# Patient Record
Sex: FEMALE | Race: OTHER | NOT HISPANIC OR LATINO
[De-identification: names, ages, dates, MRNs, and addresses within clinical notes are randomized per-mention and may not be internally consistent; named-entity substitution may affect disease eponyms.]

---

## 2017-01-03 ENCOUNTER — APPOINTMENT (OUTPATIENT)
Dept: INTERNAL MEDICINE | Facility: CLINIC | Age: 62
End: 2017-01-03

## 2017-02-07 ENCOUNTER — APPOINTMENT (OUTPATIENT)
Dept: INTERNAL MEDICINE | Facility: CLINIC | Age: 62
End: 2017-02-07

## 2017-03-31 ENCOUNTER — APPOINTMENT (OUTPATIENT)
Dept: CT IMAGING | Facility: CLINIC | Age: 62
End: 2017-03-31

## 2017-03-31 ENCOUNTER — OUTPATIENT (OUTPATIENT)
Dept: OUTPATIENT SERVICES | Facility: HOSPITAL | Age: 62
LOS: 1 days | End: 2017-03-31
Payer: COMMERCIAL

## 2017-03-31 DIAGNOSIS — Z41.9 ENCOUNTER FOR PROCEDURE FOR PURPOSES OTHER THAN REMEDYING HEALTH STATE, UNSPECIFIED: Chronic | ICD-10-CM

## 2017-03-31 DIAGNOSIS — Z95.5 PRESENCE OF CORONARY ANGIOPLASTY IMPLANT AND GRAFT: Chronic | ICD-10-CM

## 2017-03-31 DIAGNOSIS — Z90.89 ACQUIRED ABSENCE OF OTHER ORGANS: Chronic | ICD-10-CM

## 2017-03-31 DIAGNOSIS — Z00.8 ENCOUNTER FOR OTHER GENERAL EXAMINATION: ICD-10-CM

## 2017-03-31 DIAGNOSIS — Z90.49 ACQUIRED ABSENCE OF OTHER SPECIFIED PARTS OF DIGESTIVE TRACT: Chronic | ICD-10-CM

## 2017-03-31 PROCEDURE — G0297: CPT

## 2017-05-12 ENCOUNTER — APPOINTMENT (OUTPATIENT)
Dept: INTERNAL MEDICINE | Facility: CLINIC | Age: 62
End: 2017-05-12

## 2017-08-30 ENCOUNTER — APPOINTMENT (OUTPATIENT)
Dept: INTERNAL MEDICINE | Facility: CLINIC | Age: 62
End: 2017-08-30
Payer: COMMERCIAL

## 2017-08-30 PROCEDURE — 99214 OFFICE O/P EST MOD 30 MIN: CPT | Mod: 25

## 2017-08-30 PROCEDURE — 36415 COLL VENOUS BLD VENIPUNCTURE: CPT

## 2017-12-28 ENCOUNTER — APPOINTMENT (OUTPATIENT)
Dept: INTERNAL MEDICINE | Facility: CLINIC | Age: 62
End: 2017-12-28
Payer: COMMERCIAL

## 2017-12-28 DIAGNOSIS — Z92.89 PERSONAL HISTORY OF OTHER MEDICAL TREATMENT: ICD-10-CM

## 2017-12-28 DIAGNOSIS — Z78.9 OTHER SPECIFIED HEALTH STATUS: ICD-10-CM

## 2017-12-28 DIAGNOSIS — Z86.79 PERSONAL HISTORY OF OTHER DISEASES OF THE CIRCULATORY SYSTEM: ICD-10-CM

## 2017-12-28 DIAGNOSIS — Z95.820 PERIPHERAL VASCULAR ANGIOPLASTY STATUS WITH IMPLANTS AND GRAFTS: ICD-10-CM

## 2017-12-28 DIAGNOSIS — R74.8 ABNORMAL LEVELS OF OTHER SERUM ENZYMES: ICD-10-CM

## 2017-12-28 DIAGNOSIS — Z82.49 FAMILY HISTORY OF ISCHEMIC HEART DISEASE AND OTHER DISEASES OF THE CIRCULATORY SYSTEM: ICD-10-CM

## 2017-12-28 DIAGNOSIS — R91.8 OTHER NONSPECIFIC ABNORMAL FINDING OF LUNG FIELD: ICD-10-CM

## 2017-12-28 PROCEDURE — 93000 ELECTROCARDIOGRAM COMPLETE: CPT

## 2017-12-28 PROCEDURE — 36415 COLL VENOUS BLD VENIPUNCTURE: CPT

## 2017-12-28 PROCEDURE — 94010 BREATHING CAPACITY TEST: CPT

## 2017-12-28 PROCEDURE — 99396 PREV VISIT EST AGE 40-64: CPT | Mod: 25

## 2017-12-28 PROCEDURE — G0444 DEPRESSION SCREEN ANNUAL: CPT

## 2018-08-08 PROBLEM — Z92.89 H/O MAMMOGRAM: Status: RESOLVED | Noted: 2018-08-08 | Resolved: 2018-08-08

## 2018-08-08 PROBLEM — Z82.49 FAMILY HISTORY OF CARDIAC DISORDER: Status: ACTIVE | Noted: 2018-08-08

## 2018-08-08 PROBLEM — Z95.820 S/P PERIPHERAL ARTERY ANGIOPLASTY WITH STENT PLACEMENT: Status: RESOLVED | Noted: 2018-08-08 | Resolved: 2018-08-08

## 2018-08-08 PROBLEM — Z86.79 HISTORY OF INTERMITTENT CLAUDICATION: Status: RESOLVED | Noted: 2018-08-08 | Resolved: 2018-08-08

## 2018-08-08 PROBLEM — Z92.89 HISTORY OF EKG: Status: RESOLVED | Noted: 2018-08-08 | Resolved: 2018-08-08

## 2018-08-08 PROBLEM — R74.8 ALKALINE PHOSPHATASE RAISED: Status: RESOLVED | Noted: 2018-08-08 | Resolved: 2018-08-08

## 2018-08-08 PROBLEM — Z92.89 HISTORY OF BONE DENSITY STUDY: Status: RESOLVED | Noted: 2018-08-08 | Resolved: 2018-08-08

## 2018-08-08 PROBLEM — Z78.9 CURRENT DRINKER OF ALCOHOL: Status: ACTIVE | Noted: 2018-08-08

## 2018-09-12 ENCOUNTER — APPOINTMENT (OUTPATIENT)
Dept: INTERNAL MEDICINE | Facility: CLINIC | Age: 63
End: 2018-09-12
Payer: COMMERCIAL

## 2018-09-12 VITALS
BODY MASS INDEX: 24.92 KG/M2 | HEIGHT: 64 IN | DIASTOLIC BLOOD PRESSURE: 70 MMHG | WEIGHT: 146 LBS | SYSTOLIC BLOOD PRESSURE: 110 MMHG

## 2018-09-12 PROCEDURE — 99406 BEHAV CHNG SMOKING 3-10 MIN: CPT

## 2018-09-12 PROCEDURE — 99215 OFFICE O/P EST HI 40 MIN: CPT | Mod: 25

## 2018-09-12 PROCEDURE — 36415 COLL VENOUS BLD VENIPUNCTURE: CPT

## 2018-09-12 RX ORDER — FLUTICASONE PROPIONATE 50 UG/1
50 SPRAY, METERED NASAL DAILY
Qty: 1 | Refills: 5 | Status: DISCONTINUED | COMMUNITY
End: 2018-09-12

## 2018-09-12 NOTE — HISTORY OF PRESENT ILLNESS
[FreeTextEntry1] : check up on medical issues [de-identified] : F/UP GYN\par CARDIAC STABLE\par COLON PENDING\par She is for routine followup on multiple medical problems. She recently saw her gynecologist a negative evaluation. She saw her cardiologist and had a normal evaluation she has both cardiac and peripheral artery stents. She is compliant with medications. There is no complaints of chest pain or shortness of breath. She is due for followup pulmonary

## 2018-09-12 NOTE — COUNSELING
[Tobacco Use Cessation Intermediate Greater Than 3 Minutes Up to 10 Minutes] : Tobacco Use Cessation Intermediate Greater Than 3 Minutes Up to 10 Minutes [de-identified] : Cigarette cessation counseling was provided to the patient. She will follow Dr. Molina for CAT scan and we work [place]

## 2018-09-12 NOTE — PLAN
[FreeTextEntry1] : Patient examined and adjustments to therapy for HBP not need All labs reviewed with patient as well. Review of systems and physical exam discussed with patient. Care plan for future followups discussed with patient. All issues of health for the current year discussed in depth with patient who was conversant and all relevant issues addressed.\par Cholesterol levels discussed with patient. Compliance with oral medication were also addressed . Ideal LDL levels were  discussed with the patient. All issues regarding the implications of high cholesterol necessity for treatment were discussed with the patient who is conversant and all relevant questions were asked and answered. Patient LDL goal of less than 70. She is compliant with her follow up with cardiologist and repeat lab will be drawn today. In addition she is prediabetic and A1c will be checked\par She has a history COPD and needs yearly CAT scan screening. She did not get it since the last few years and will be referred to Dr. Myrick for followup evaluation and CAT scan.\par \par Routine laboratory data will be drawn, scheduled for a follow up well is in 6 months and issues of immunization and smoking cessation were discussed

## 2018-09-13 LAB
ALBUMIN SERPL ELPH-MCNC: 4.4 G/DL
ALP BLD-CCNC: 103 U/L
ALT SERPL-CCNC: 17 U/L
ANION GAP SERPL CALC-SCNC: 13 MMOL/L
AST SERPL-CCNC: 17 U/L
BASOPHILS # BLD AUTO: 0.08 K/UL
BASOPHILS NFR BLD AUTO: 0.9 %
BILIRUB SERPL-MCNC: 0.3 MG/DL
BUN SERPL-MCNC: 20 MG/DL
CALCIUM SERPL-MCNC: 10.4 MG/DL
CHLORIDE SERPL-SCNC: 101 MMOL/L
CHOLEST SERPL-MCNC: 159 MG/DL
CHOLEST/HDLC SERPL: 2.9 RATIO
CO2 SERPL-SCNC: 24 MMOL/L
CREAT SERPL-MCNC: 0.67 MG/DL
EOSINOPHIL # BLD AUTO: 0.36 K/UL
EOSINOPHIL NFR BLD AUTO: 4.1 %
GLUCOSE SERPL-MCNC: 103 MG/DL
HBA1C MFR BLD HPLC: 5.8 %
HCT VFR BLD CALC: 41.7 %
HDLC SERPL-MCNC: 55 MG/DL
HGB BLD-MCNC: 13.2 G/DL
IMM GRANULOCYTES NFR BLD AUTO: 0.2 %
LDLC SERPL CALC-MCNC: 68 MG/DL
LYMPHOCYTES # BLD AUTO: 2.55 K/UL
LYMPHOCYTES NFR BLD AUTO: 29 %
MAN DIFF?: NORMAL
MCHC RBC-ENTMCNC: 28.8 PG
MCHC RBC-ENTMCNC: 31.7 GM/DL
MCV RBC AUTO: 90.8 FL
MONOCYTES # BLD AUTO: 0.56 K/UL
MONOCYTES NFR BLD AUTO: 6.4 %
NEUTROPHILS # BLD AUTO: 5.21 K/UL
NEUTROPHILS NFR BLD AUTO: 59.4 %
PLATELET # BLD AUTO: 261 K/UL
POTASSIUM SERPL-SCNC: 5.1 MMOL/L
PROT SERPL-MCNC: 7 G/DL
RBC # BLD: 4.59 M/UL
RBC # FLD: 13.9 %
SODIUM SERPL-SCNC: 138 MMOL/L
TRIGL SERPL-MCNC: 178 MG/DL
WBC # FLD AUTO: 8.78 K/UL

## 2018-11-26 ENCOUNTER — MEDICATION RENEWAL (OUTPATIENT)
Age: 63
End: 2018-11-26

## 2018-11-29 ENCOUNTER — APPOINTMENT (OUTPATIENT)
Dept: GASTROENTEROLOGY | Facility: CLINIC | Age: 63
End: 2018-11-29
Payer: COMMERCIAL

## 2018-11-29 VITALS
OXYGEN SATURATION: 98 % | HEIGHT: 64 IN | RESPIRATION RATE: 15 BRPM | SYSTOLIC BLOOD PRESSURE: 137 MMHG | WEIGHT: 145 LBS | HEART RATE: 79 BPM | BODY MASS INDEX: 24.75 KG/M2 | DIASTOLIC BLOOD PRESSURE: 92 MMHG

## 2018-11-29 DIAGNOSIS — Z12.11 ENCOUNTER FOR SCREENING FOR MALIGNANT NEOPLASM OF COLON: ICD-10-CM

## 2018-11-29 PROCEDURE — 82272 OCCULT BLD FECES 1-3 TESTS: CPT

## 2018-11-29 PROCEDURE — 99202 OFFICE O/P NEW SF 15 MIN: CPT

## 2018-12-11 ENCOUNTER — MEDICATION RENEWAL (OUTPATIENT)
Age: 63
End: 2018-12-11

## 2019-01-15 ENCOUNTER — MEDICATION RENEWAL (OUTPATIENT)
Age: 64
End: 2019-01-15

## 2019-02-28 ENCOUNTER — MEDICATION RENEWAL (OUTPATIENT)
Age: 64
End: 2019-02-28

## 2019-04-17 ENCOUNTER — APPOINTMENT (OUTPATIENT)
Dept: INTERNAL MEDICINE | Facility: CLINIC | Age: 64
End: 2019-04-17
Payer: COMMERCIAL

## 2019-04-17 VITALS
WEIGHT: 144 LBS | DIASTOLIC BLOOD PRESSURE: 70 MMHG | HEIGHT: 64 IN | BODY MASS INDEX: 24.59 KG/M2 | SYSTOLIC BLOOD PRESSURE: 120 MMHG

## 2019-04-17 PROCEDURE — G0009: CPT

## 2019-04-17 PROCEDURE — 99406 BEHAV CHNG SMOKING 3-10 MIN: CPT | Mod: 25

## 2019-04-17 PROCEDURE — G0296 VISIT TO DETERM LDCT ELIG: CPT

## 2019-04-17 PROCEDURE — 90670 PCV13 VACCINE IM: CPT

## 2019-04-17 PROCEDURE — 99215 OFFICE O/P EST HI 40 MIN: CPT | Mod: 25

## 2019-04-17 NOTE — COUNSELING
[ - Annual Lung Cancer Screening/Share Decision Making Discussion] : Annual Lung Cancer Screening/Share Decision Making Discussion. (I have advised this patient to have a Low Dose CT (LDCT) scan of the lungs and have discussed the following with the patient in a shared decision making discussion:   Benefits of Detection and Early Treatment: There is adequate evidence that annual screening for lung cancer with LDCT in a population of high-risk persons can prevent a substantial number of lung cancer–related deaths. The magnitude of benefit depends on the individual patient's risk for lung cancer, as those who are at highest risk are most likely to benefit. Screening cannot prevent most lung cancer–related deaths, and does not replace smoking cessation. Harms of Detection and Early Intervention and Treatment: The harms associated with LDCT screening include false-negative and false-positive results, incidental findings, over diagnosis, and radiation exposure. False-positive LDCT results occur in a substantial proportion of screened persons; 95% of all positive results do not lead to a diagnosis of cancer. In a high-quality screening program, further imaging can resolve most false-positive results; however, some patients may require invasive procedures. Radiation harms, including cancer resulting from cumulative exposure to radiation, vary depending on the age at the start of screening; the number of scans received; and the person's exposure to other sources of radiation, particularly other medical imaging.) [Tobacco Use Cessation Intermediate Greater Than 3 Minutes Up to 10 Minutes] : Tobacco Use Cessation Intermediate Greater Than 3 Minutes Up to 10 Minutes

## 2019-04-17 NOTE — HISTORY OF PRESENT ILLNESS
[Spouse] : spouse [FreeTextEntry1] : Patient for followup of her multiple medical problems including heart disease, peripheral vascular disease hypertension and diabetes [de-identified] : H&P her in followup for multiple medical problems include being an active smoker heart disease, peripheral vascular disease, prediabetes and hypertension high cholesterol. She is due to see her cardiologist and scheduled her for colonoscopy. She is due for her yearly CAT scan screening of her lungs as well as pneumococcal vaccination.\par \par She has no complaints of chest pain shortness of breath does not have a smokers cough and denies productive sputum. She is compliant with all the medications

## 2019-04-17 NOTE — PHYSICAL EXAM
[No Acute Distress] : no acute distress [Well Nourished] : well nourished [Well Developed] : well developed [Normal Sclera/Conjunctiva] : normal sclera/conjunctiva [Well-Appearing] : well-appearing [PERRL] : pupils equal round and reactive to light [EOMI] : extraocular movements intact [Normal Outer Ear/Nose] : the outer ears and nose were normal in appearance [Normal Oropharynx] : the oropharynx was normal [Supple] : supple [No JVD] : no jugular venous distention [No Lymphadenopathy] : no lymphadenopathy [No Respiratory Distress] : no respiratory distress  [Thyroid Normal, No Nodules] : the thyroid was normal and there were no nodules present [Clear to Auscultation] : lungs were clear to auscultation bilaterally [Normal Rate] : normal rate  [No Accessory Muscle Use] : no accessory muscle use [Regular Rhythm] : with a regular rhythm [Normal S1, S2] : normal S1 and S2 [No Murmur] : no murmur heard [No Abdominal Bruit] : a ~M bruit was not heard ~T in the abdomen [No Carotid Bruits] : no carotid bruits [No Varicosities] : no varicosities [Pedal Pulses Present] : the pedal pulses are present [No Edema] : there was no peripheral edema [No Extremity Clubbing/Cyanosis] : no extremity clubbing/cyanosis [No Palpable Aorta] : no palpable aorta [Soft] : abdomen soft [Non Tender] : non-tender [Non-distended] : non-distended [No Masses] : no abdominal mass palpated [No HSM] : no HSM [Normal Bowel Sounds] : normal bowel sounds [Normal Posterior Cervical Nodes] : no posterior cervical lymphadenopathy [Normal Anterior Cervical Nodes] : no anterior cervical lymphadenopathy [No CVA Tenderness] : no CVA  tenderness [No Spinal Tenderness] : no spinal tenderness [No Joint Swelling] : no joint swelling [Grossly Normal Strength/Tone] : grossly normal strength/tone [No Rash] : no rash [Normal Gait] : normal gait [Coordination Grossly Intact] : coordination grossly intact [No Focal Deficits] : no focal deficits [Deep Tendon Reflexes (DTR)] : deep tendon reflexes were 2+ and symmetric [Normal Affect] : the affect was normal [Normal Insight/Judgement] : insight and judgment were intact

## 2019-04-17 NOTE — PLAN
[FreeTextEntry1] : Multiple medical problems were discussed with the patient in an extended visit.\par \par Patient examined and adjustments to therapy for HBP and notrequired All labs reviewed with patient as well. Review of systems and physical exam discussed with patient. Care plan for future followups discussed with patient. All issues of health for the current year discussed in depth with patient who was conversant and all relevant issues addressed.\par \par Results of current evaluation discussed with patient. Medications were reviewed and all relevant labs as well as a 1C level and glucose levels were discussed in depth with  patient. Further options for ideal control were discussed, long-term complications of diabetes and screening for complications were also discussed. Patient was conversant and all relevant questions were asked and answered. Last A1c was 5.9 on metformin and that will be continued. The possibility of changing to jardiance as a cardioprotective agent is deferred as patient was only prediabetic\par \par Cholesterol levels discussed with patient. Compliance with oral medication were also addressed . Ideal LDL levels were  discussed with the patient. All issues regarding the implications of high cholesterol necessity for treatment were discussed with the patient who is conversant and all relevant questions were asked and answered. Last LDL was under 70 and will be repeated\par \par Patient has known emphysema and still smokes despite smoking cessation counseling. It is time to do patient's yearly followup low-dose CAT scan screening which was ordered in addition to discussion with the patient regarding cessation\par \par All issues regarding patient's health and medical problems have been discussed. The patient understands and concurs with the treatment plan.

## 2019-04-18 LAB
ALBUMIN SERPL ELPH-MCNC: 4.9 G/DL
ALP BLD-CCNC: 103 U/L
ALT SERPL-CCNC: 18 U/L
ANION GAP SERPL CALC-SCNC: 11 MMOL/L
AST SERPL-CCNC: 19 U/L
BILIRUB SERPL-MCNC: 0.4 MG/DL
BUN SERPL-MCNC: 20 MG/DL
CALCIUM SERPL-MCNC: 10.6 MG/DL
CHLORIDE SERPL-SCNC: 102 MMOL/L
CHOLEST SERPL-MCNC: 157 MG/DL
CHOLEST/HDLC SERPL: 3.1 RATIO
CO2 SERPL-SCNC: 24 MMOL/L
CREAT SERPL-MCNC: 0.76 MG/DL
ESTIMATED AVERAGE GLUCOSE: 120 MG/DL
GLUCOSE SERPL-MCNC: 100 MG/DL
HBA1C MFR BLD HPLC: 5.8 %
HDLC SERPL-MCNC: 51 MG/DL
LDLC SERPL CALC-MCNC: 73 MG/DL
POTASSIUM SERPL-SCNC: 5.6 MMOL/L
PROT SERPL-MCNC: 7.1 G/DL
SODIUM SERPL-SCNC: 137 MMOL/L
TRIGL SERPL-MCNC: 166 MG/DL

## 2019-04-19 DIAGNOSIS — E87.5 HYPERKALEMIA: ICD-10-CM

## 2019-04-25 LAB
ALBUMIN SERPL ELPH-MCNC: 4.7 G/DL
ALP BLD-CCNC: 105 U/L
ALT SERPL-CCNC: 19 U/L
ANION GAP SERPL CALC-SCNC: 13 MMOL/L
AST SERPL-CCNC: 17 U/L
BILIRUB SERPL-MCNC: 0.2 MG/DL
BUN SERPL-MCNC: 18 MG/DL
CALCIUM SERPL-MCNC: 10.5 MG/DL
CHLORIDE SERPL-SCNC: 101 MMOL/L
CO2 SERPL-SCNC: 23 MMOL/L
CREAT SERPL-MCNC: 0.79 MG/DL
GLUCOSE SERPL-MCNC: 82 MG/DL
POTASSIUM SERPL-SCNC: 5.3 MMOL/L
PROT SERPL-MCNC: 6.9 G/DL
SODIUM SERPL-SCNC: 137 MMOL/L

## 2019-05-20 ENCOUNTER — RX RENEWAL (OUTPATIENT)
Age: 64
End: 2019-05-20

## 2019-05-20 ENCOUNTER — FORM ENCOUNTER (OUTPATIENT)
Age: 64
End: 2019-05-20

## 2019-05-21 ENCOUNTER — OUTPATIENT (OUTPATIENT)
Dept: OUTPATIENT SERVICES | Facility: HOSPITAL | Age: 64
LOS: 1 days | End: 2019-05-21
Payer: SELF-PAY

## 2019-05-21 ENCOUNTER — APPOINTMENT (OUTPATIENT)
Dept: CT IMAGING | Facility: CLINIC | Age: 64
End: 2019-05-21
Payer: COMMERCIAL

## 2019-05-21 DIAGNOSIS — Z00.00 ENCOUNTER FOR GENERAL ADULT MEDICAL EXAMINATION WITHOUT ABNORMAL FINDINGS: ICD-10-CM

## 2019-05-21 DIAGNOSIS — Z72.0 TOBACCO USE: ICD-10-CM

## 2019-05-21 DIAGNOSIS — Z41.9 ENCOUNTER FOR PROCEDURE FOR PURPOSES OTHER THAN REMEDYING HEALTH STATE, UNSPECIFIED: Chronic | ICD-10-CM

## 2019-05-21 DIAGNOSIS — J43.9 EMPHYSEMA, UNSPECIFIED: ICD-10-CM

## 2019-05-21 DIAGNOSIS — Z90.89 ACQUIRED ABSENCE OF OTHER ORGANS: Chronic | ICD-10-CM

## 2019-05-21 DIAGNOSIS — Z90.49 ACQUIRED ABSENCE OF OTHER SPECIFIED PARTS OF DIGESTIVE TRACT: Chronic | ICD-10-CM

## 2019-05-21 DIAGNOSIS — Z95.5 PRESENCE OF CORONARY ANGIOPLASTY IMPLANT AND GRAFT: Chronic | ICD-10-CM

## 2019-05-21 PROCEDURE — G0297: CPT

## 2019-05-21 PROCEDURE — G0297: CPT | Mod: 26

## 2019-05-27 ENCOUNTER — RX RENEWAL (OUTPATIENT)
Age: 64
End: 2019-05-27

## 2019-06-10 ENCOUNTER — RX RENEWAL (OUTPATIENT)
Age: 64
End: 2019-06-10

## 2019-07-14 ENCOUNTER — RX RENEWAL (OUTPATIENT)
Age: 64
End: 2019-07-14

## 2019-08-12 ENCOUNTER — APPOINTMENT (OUTPATIENT)
Dept: INTERNAL MEDICINE | Facility: CLINIC | Age: 64
End: 2019-08-12
Payer: COMMERCIAL

## 2019-08-12 VITALS
SYSTOLIC BLOOD PRESSURE: 130 MMHG | BODY MASS INDEX: 24.75 KG/M2 | WEIGHT: 145 LBS | DIASTOLIC BLOOD PRESSURE: 70 MMHG | HEIGHT: 64 IN

## 2019-08-12 PROCEDURE — 36415 COLL VENOUS BLD VENIPUNCTURE: CPT

## 2019-08-12 PROCEDURE — 99214 OFFICE O/P EST MOD 30 MIN: CPT | Mod: 25

## 2019-08-12 NOTE — PHYSICAL EXAM
[Well Nourished] : well nourished [No Acute Distress] : no acute distress [Well Developed] : well developed [Well-Appearing] : well-appearing [EOMI] : extraocular movements intact [PERRL] : pupils equal round and reactive to light [Normal Sclera/Conjunctiva] : normal sclera/conjunctiva [Normal Outer Ear/Nose] : the outer ears and nose were normal in appearance [Normal Oropharynx] : the oropharynx was normal [No JVD] : no jugular venous distention [No Lymphadenopathy] : no lymphadenopathy [Supple] : supple [No Respiratory Distress] : no respiratory distress  [Thyroid Normal, No Nodules] : the thyroid was normal and there were no nodules present [No Accessory Muscle Use] : no accessory muscle use [Clear to Auscultation] : lungs were clear to auscultation bilaterally [Normal Rate] : normal rate  [Regular Rhythm] : with a regular rhythm [Normal S1, S2] : normal S1 and S2 [No Murmur] : no murmur heard [No Abdominal Bruit] : a ~M bruit was not heard ~T in the abdomen [No Carotid Bruits] : no carotid bruits [No Varicosities] : no varicosities [Pedal Pulses Present] : the pedal pulses are present [No Edema] : there was no peripheral edema [No Palpable Aorta] : no palpable aorta [No Extremity Clubbing/Cyanosis] : no extremity clubbing/cyanosis [Soft] : abdomen soft [Non Tender] : non-tender [Non-distended] : non-distended [No Masses] : no abdominal mass palpated [No HSM] : no HSM [Normal Bowel Sounds] : normal bowel sounds [Normal Posterior Cervical Nodes] : no posterior cervical lymphadenopathy [Normal Anterior Cervical Nodes] : no anterior cervical lymphadenopathy [No CVA Tenderness] : no CVA  tenderness [No Spinal Tenderness] : no spinal tenderness [No Joint Swelling] : no joint swelling [Grossly Normal Strength/Tone] : grossly normal strength/tone [No Rash] : no rash [Coordination Grossly Intact] : coordination grossly intact [Normal Gait] : normal gait [No Focal Deficits] : no focal deficits [Deep Tendon Reflexes (DTR)] : deep tendon reflexes were 2+ and symmetric [Normal Affect] : the affect was normal [Normal Insight/Judgement] : insight and judgment were intact

## 2019-08-12 NOTE — ASSESSMENT
[FreeTextEntry1] : Patient examined and adjustments to therapy for HBP not required All labs reviewed with patient as well. Review of systems and physical exam discussed with patient. Care plan for future followups discussed with patient. All issues of health for the current year discussed in depth with patient who was conversant and all relevant issues addressed.\par \par Cholesterol levels discussed with patient. Compliance with oral medication were also addressed . Ideal LDL levels were  discussed with the patient. All issues regarding the implications of high cholesterol necessity for treatment were discussed with the patient who is conversant and all relevant questions were asked and answered. Patient on moderate dose statin but has stent was an increased high intensity statin pravastatin 80 mg\par \par Patient with postnasal drip and Claritin added as well as Flonase\par \par Peripheral vascular disease stable and patient exercising overall doing well. We'll follow up in 4 months for general wellness

## 2019-08-12 NOTE — HISTORY OF PRESENT ILLNESS
[FreeTextEntry1] : Patient here for evaluation of multiple medical problems\par  [de-identified] : Patient is here for evaluation of multiple medical problems. She also has significant postnasal drip and chronic cough and allergy issues. She has no complaints of chest pain or shortness of breath and had a recent negative lung screening CAT scan

## 2019-09-13 ENCOUNTER — RX RENEWAL (OUTPATIENT)
Age: 64
End: 2019-09-13

## 2019-11-13 ENCOUNTER — RX RENEWAL (OUTPATIENT)
Age: 64
End: 2019-11-13

## 2019-12-03 PROBLEM — Z13.29 SCREENING FOR THYROID DISORDER: Status: ACTIVE | Noted: 2019-12-03

## 2019-12-05 ENCOUNTER — APPOINTMENT (OUTPATIENT)
Dept: INTERNAL MEDICINE | Facility: CLINIC | Age: 64
End: 2019-12-05
Payer: COMMERCIAL

## 2019-12-05 VITALS
TEMPERATURE: 98.2 F | WEIGHT: 145 LBS | BODY MASS INDEX: 24.75 KG/M2 | HEIGHT: 64 IN | DIASTOLIC BLOOD PRESSURE: 75 MMHG | SYSTOLIC BLOOD PRESSURE: 180 MMHG

## 2019-12-05 DIAGNOSIS — Z13.29 ENCOUNTER FOR SCREENING FOR OTHER SUSPECTED ENDOCRINE DISORDER: ICD-10-CM

## 2019-12-05 PROCEDURE — 99406 BEHAV CHNG SMOKING 3-10 MIN: CPT | Mod: 25

## 2019-12-05 PROCEDURE — G0444 DEPRESSION SCREEN ANNUAL: CPT

## 2019-12-05 PROCEDURE — 90662 IIV NO PRSV INCREASED AG IM: CPT

## 2019-12-05 PROCEDURE — 93000 ELECTROCARDIOGRAM COMPLETE: CPT

## 2019-12-05 PROCEDURE — G0008: CPT

## 2019-12-05 PROCEDURE — 36415 COLL VENOUS BLD VENIPUNCTURE: CPT

## 2019-12-05 PROCEDURE — 99397 PER PM REEVAL EST PAT 65+ YR: CPT | Mod: 25

## 2019-12-05 NOTE — COUNSELING
[Risk of tobacco use and health benefits of smoking cessation discussed] : Risk of tobacco use and health benefits of smoking cessation discussed [Cessation strategies including cessation program discussed] : Cessation strategies including cessation program discussed [Use of nicotine replacement therapies and other medications discussed] : Use of nicotine replacement therapies and other medications discussed [Encouraged to pick a quit date and identify support needed to quit] : Encouraged to pick a quit date and identify support needed to quit [Willing to Quit Smoking] : Not willing to quit smoking [Tobacco Use Cessation Intermediate Greater Than 3 Minutes Up to 10 Minutes] : Tobacco Use Cessation Intermediate Greater Than 3 Minutes Up to 10 Minutes [FreeTextEntry1] : 10

## 2019-12-05 NOTE — HEALTH RISK ASSESSMENT
[Very Good] : ~his/her~ current health as very good [1] : 2) Feeling down, depressed, or hopeless for several days (1) [No falls in past year] : Patient reported no falls in the past year [With Significant Other] : lives with significant other [] :  [Fully functional (bathing, dressing, toileting, transferring, walking, feeding)] : Fully functional (bathing, dressing, toileting, transferring, walking, feeding) [Fully functional (using the telephone, shopping, preparing meals, housekeeping, doing laundry, using] : Fully functional and needs no help or supervision to perform IADLs (using the telephone, shopping, preparing meals, housekeeping, doing laundry, using transportation, managing medications and managing finances) [Patient reported colonoscopy was normal] : Patient reported colonoscopy was normal [HIV test declined] : HIV test declined [Hepatitis C test declined] : Hepatitis C test declined [Patient reported bone density results were normal] : Patient reported bone density results were normal [Patient reported mammogram was normal] : Patient reported mammogram was normal [] : Yes [No] : No [30 or more] : 30 or more [LowDoseCTScan] : [id number] [[id number]] : 2 [Language] : denies difficulty with language [Change in mental status noted] : No change in mental status noted [None] : None [Sexually Active] : sexually active [MammogramDate] : 01/01/2018 [BoneDensityDate] : 01/01/2015 [ColonoscopyDate] : 01/01/2009 [ColonoscopyComments] : overdue with gi

## 2019-12-05 NOTE — REVIEW OF SYSTEMS
[Fatigue] : fatigue [Nasal Discharge] : nasal discharge [Wheezing] : wheezing [Cough] : cough [Negative] : Neurological

## 2019-12-05 NOTE — HISTORY OF PRESENT ILLNESS
[FreeTextEntry1] : wellness and med issues [de-identified] : Patient here for a routine wellness and discussion of multiple medical problems including hypertension peripheral vascular disease heart disease and diabetes. She is compliant with her medications. She has not followed up with GI for colonoscopy.\par Patient continues to smoke despite multiple attempted interventions and yearly counseling but is compliant with CAT scan screening\par She is up-to-date with cardiology evaluation but no note seen. She is on Plavix for unknown reasons\par \par Patient also now with chronic cough ongoing for 2 weeks and to rest her illness with periodic wheezing

## 2019-12-05 NOTE — PHYSICAL EXAM
[No Acute Distress] : no acute distress [Well Developed] : well developed [Well Nourished] : well nourished [Well-Appearing] : well-appearing [Normal Sclera/Conjunctiva] : normal sclera/conjunctiva [PERRL] : pupils equal round and reactive to light [Normal Outer Ear/Nose] : the outer ears and nose were normal in appearance [EOMI] : extraocular movements intact [No JVD] : no jugular venous distention [Normal Oropharynx] : the oropharynx was normal [Supple] : supple [No Lymphadenopathy] : no lymphadenopathy [No Respiratory Distress] : no respiratory distress  [Thyroid Normal, No Nodules] : the thyroid was normal and there were no nodules present [Clear to Auscultation] : lungs were clear to auscultation bilaterally [No Accessory Muscle Use] : no accessory muscle use [Regular Rhythm] : with a regular rhythm [Normal Rate] : normal rate  [Normal S1, S2] : normal S1 and S2 [No Murmur] : no murmur heard [No Carotid Bruits] : no carotid bruits [No Abdominal Bruit] : a ~M bruit was not heard ~T in the abdomen [No Varicosities] : no varicosities [Pedal Pulses Present] : the pedal pulses are present [No Edema] : there was no peripheral edema [No Palpable Aorta] : no palpable aorta [No Extremity Clubbing/Cyanosis] : no extremity clubbing/cyanosis [Soft] : abdomen soft [Non Tender] : non-tender [No Masses] : no abdominal mass palpated [Non-distended] : non-distended [No HSM] : no HSM [Normal Bowel Sounds] : normal bowel sounds [Normal Anterior Cervical Nodes] : no anterior cervical lymphadenopathy [Normal Posterior Cervical Nodes] : no posterior cervical lymphadenopathy [No CVA Tenderness] : no CVA  tenderness [No Joint Swelling] : no joint swelling [No Spinal Tenderness] : no spinal tenderness [Grossly Normal Strength/Tone] : grossly normal strength/tone [No Rash] : no rash [Coordination Grossly Intact] : coordination grossly intact [No Focal Deficits] : no focal deficits [Normal Gait] : normal gait [Normal Affect] : the affect was normal [Deep Tendon Reflexes (DTR)] : deep tendon reflexes were 2+ and symmetric [Normal Insight/Judgement] : insight and judgment were intact

## 2019-12-07 ENCOUNTER — RX RENEWAL (OUTPATIENT)
Age: 64
End: 2019-12-07

## 2019-12-08 LAB
25(OH)D3 SERPL-MCNC: 40.9 NG/ML
ALBUMIN SERPL ELPH-MCNC: 4.8 G/DL
ALP BLD-CCNC: 109 U/L
ALT SERPL-CCNC: 22 U/L
ANION GAP SERPL CALC-SCNC: 15 MMOL/L
APPEARANCE: CLEAR
AST SERPL-CCNC: 20 U/L
BACTERIA: NEGATIVE
BASOPHILS # BLD AUTO: 0.12 K/UL
BASOPHILS NFR BLD AUTO: 1.1 %
BILIRUB SERPL-MCNC: 0.3 MG/DL
BILIRUBIN URINE: NEGATIVE
BLOOD URINE: NEGATIVE
BUN SERPL-MCNC: 20 MG/DL
CALCIUM SERPL-MCNC: 10.3 MG/DL
CHLORIDE SERPL-SCNC: 101 MMOL/L
CHOLEST SERPL-MCNC: 148 MG/DL
CHOLEST/HDLC SERPL: 2.7 RATIO
CO2 SERPL-SCNC: 24 MMOL/L
COLOR: NORMAL
CREAT SERPL-MCNC: 0.78 MG/DL
EOSINOPHIL # BLD AUTO: 0.25 K/UL
EOSINOPHIL NFR BLD AUTO: 2.2 %
ESTIMATED AVERAGE GLUCOSE: 123 MG/DL
GLUCOSE QUALITATIVE U: NEGATIVE
GLUCOSE SERPL-MCNC: 86 MG/DL
HBA1C MFR BLD HPLC: 5.9 %
HCT VFR BLD CALC: 44.7 %
HDLC SERPL-MCNC: 55 MG/DL
HGB BLD-MCNC: 13.9 G/DL
HYALINE CASTS: 0 /LPF
IMM GRANULOCYTES NFR BLD AUTO: 0.7 %
KETONES URINE: NEGATIVE
LDLC SERPL CALC-MCNC: 70 MG/DL
LEUKOCYTE ESTERASE URINE: NEGATIVE
LYMPHOCYTES # BLD AUTO: 3.01 K/UL
LYMPHOCYTES NFR BLD AUTO: 26.4 %
MAN DIFF?: NORMAL
MCHC RBC-ENTMCNC: 29.3 PG
MCHC RBC-ENTMCNC: 31.1 GM/DL
MCV RBC AUTO: 94.1 FL
MICROSCOPIC-UA: NORMAL
MONOCYTES # BLD AUTO: 0.79 K/UL
MONOCYTES NFR BLD AUTO: 6.9 %
NEUTROPHILS # BLD AUTO: 7.15 K/UL
NEUTROPHILS NFR BLD AUTO: 62.7 %
NITRITE URINE: NEGATIVE
PH URINE: 5.5
PLATELET # BLD AUTO: 293 K/UL
POTASSIUM SERPL-SCNC: 5.3 MMOL/L
PROT SERPL-MCNC: 6.9 G/DL
PROTEIN URINE: NEGATIVE
RBC # BLD: 4.75 M/UL
RBC # FLD: 13.8 %
RED BLOOD CELLS URINE: 1 /HPF
SODIUM SERPL-SCNC: 140 MMOL/L
SPECIFIC GRAVITY URINE: 1.01
SQUAMOUS EPITHELIAL CELLS: 2 /HPF
TRIGL SERPL-MCNC: 114 MG/DL
TSH SERPL-ACNC: 3.45 UIU/ML
UROBILINOGEN URINE: NORMAL
WBC # FLD AUTO: 11.4 K/UL
WHITE BLOOD CELLS URINE: 3 /HPF

## 2019-12-17 ENCOUNTER — RX RENEWAL (OUTPATIENT)
Age: 64
End: 2019-12-17

## 2019-12-30 ENCOUNTER — RX RENEWAL (OUTPATIENT)
Age: 64
End: 2019-12-30

## 2020-02-29 ENCOUNTER — TRANSCRIPTION ENCOUNTER (OUTPATIENT)
Age: 65
End: 2020-02-29

## 2020-03-11 ENCOUNTER — RX RENEWAL (OUTPATIENT)
Age: 65
End: 2020-03-11

## 2020-03-23 ENCOUNTER — APPOINTMENT (OUTPATIENT)
Dept: INTERNAL MEDICINE | Facility: CLINIC | Age: 65
End: 2020-03-23

## 2020-05-12 ENCOUNTER — RX RENEWAL (OUTPATIENT)
Age: 65
End: 2020-05-12

## 2020-05-25 ENCOUNTER — RX RENEWAL (OUTPATIENT)
Age: 65
End: 2020-05-25

## 2020-06-07 ENCOUNTER — RX RENEWAL (OUTPATIENT)
Age: 65
End: 2020-06-07

## 2020-06-16 ENCOUNTER — APPOINTMENT (OUTPATIENT)
Dept: INTERNAL MEDICINE | Facility: CLINIC | Age: 65
End: 2020-06-16
Payer: COMMERCIAL

## 2020-06-16 VITALS
SYSTOLIC BLOOD PRESSURE: 122 MMHG | WEIGHT: 137 LBS | BODY MASS INDEX: 23.39 KG/M2 | HEIGHT: 64 IN | DIASTOLIC BLOOD PRESSURE: 70 MMHG | TEMPERATURE: 98 F

## 2020-06-16 PROCEDURE — 99214 OFFICE O/P EST MOD 30 MIN: CPT | Mod: 25

## 2020-06-16 PROCEDURE — 36415 COLL VENOUS BLD VENIPUNCTURE: CPT

## 2020-06-16 NOTE — HISTORY OF PRESENT ILLNESS
[FreeTextEntry1] : Patient here in follow up to last visit and prior issue\par  [de-identified] : Patient with multiple medical problems in routine followup. He has been compliant with all his medications and in fact has voluntarily lost weight. He is up-to-date with screening and vaccines.she has no complaints of chest pain or shortness of breath\par Patient has had no episodes of chest pain and is due for yearly screening CAT scan as she continues to smoke

## 2020-06-16 NOTE — PHYSICAL EXAM
[No Acute Distress] : no acute distress [Well Nourished] : well nourished [Well Developed] : well developed [Normal Sclera/Conjunctiva] : normal sclera/conjunctiva [Well-Appearing] : well-appearing [EOMI] : extraocular movements intact [PERRL] : pupils equal round and reactive to light [Normal Oropharynx] : the oropharynx was normal [Normal Outer Ear/Nose] : the outer ears and nose were normal in appearance [No Lymphadenopathy] : no lymphadenopathy [No JVD] : no jugular venous distention [Supple] : supple [Thyroid Normal, No Nodules] : the thyroid was normal and there were no nodules present [No Accessory Muscle Use] : no accessory muscle use [Clear to Auscultation] : lungs were clear to auscultation bilaterally [No Respiratory Distress] : no respiratory distress  [Regular Rhythm] : with a regular rhythm [Normal Rate] : normal rate  [No Murmur] : no murmur heard [Normal S1, S2] : normal S1 and S2 [No Carotid Bruits] : no carotid bruits [No Abdominal Bruit] : a ~M bruit was not heard ~T in the abdomen [No Edema] : there was no peripheral edema [Pedal Pulses Present] : the pedal pulses are present [No Varicosities] : no varicosities [No Palpable Aorta] : no palpable aorta [No Extremity Clubbing/Cyanosis] : no extremity clubbing/cyanosis [Soft] : abdomen soft [Non Tender] : non-tender [No Masses] : no abdominal mass palpated [Non-distended] : non-distended [No HSM] : no HSM [Normal Bowel Sounds] : normal bowel sounds [Normal Posterior Cervical Nodes] : no posterior cervical lymphadenopathy [No CVA Tenderness] : no CVA  tenderness [Normal Anterior Cervical Nodes] : no anterior cervical lymphadenopathy [No Spinal Tenderness] : no spinal tenderness [No Joint Swelling] : no joint swelling [Grossly Normal Strength/Tone] : grossly normal strength/tone [Coordination Grossly Intact] : coordination grossly intact [No Rash] : no rash [Normal Gait] : normal gait [Deep Tendon Reflexes (DTR)] : deep tendon reflexes were 2+ and symmetric [No Focal Deficits] : no focal deficits [Normal Affect] : the affect was normal [Normal Insight/Judgement] : insight and judgment were intact

## 2020-06-16 NOTE — ASSESSMENT
[FreeTextEntry1] : Patient examined and adjustments to therapy for HBP IS not required All labs reviewed with patient as well. Review of systems and physical exam discussed with patient. Care plan for future followups discussed with patient. All issues of health for the current year discussed in depth with patient who was conversant and all relevant issues addressed.\par \par Cholesterol levels discussed with patient. Compliance with oral medication were also addressed . Ideal LDL levels were  discussed with the patient. All issues regarding the implications of high cholesterol necessity for treatment were discussed with the patient who is conversant and all relevant questions were asked and answered.\par \par Results of current evaluation discussed with patient. Medications were reviewed and all relevant labs as well as a 1C level and glucose levels were discussed in depth with  patient. Further options for ideal control were discussed, long-term complications of diabetes and screening for complications were also discussed. Patient was conversant and all relevant questions were asked and answered.\par \par Again the issue of tobacco use was discussed briefly and will be done again during yearly physical. She continues to smoke with a mild cough. Repeat yearly CAT scan scheduled at the shared decision-making. Labs drawn and she will follow up in 4 months

## 2020-06-17 LAB
ALBUMIN SERPL ELPH-MCNC: 4.9 G/DL
ALP BLD-CCNC: 111 U/L
ALT SERPL-CCNC: 18 U/L
ANION GAP SERPL CALC-SCNC: 15 MMOL/L
AST SERPL-CCNC: 24 U/L
BASOPHILS # BLD AUTO: 0.1 K/UL
BASOPHILS NFR BLD AUTO: 1.4 %
BILIRUB SERPL-MCNC: 0.3 MG/DL
BUN SERPL-MCNC: 22 MG/DL
CALCIUM SERPL-MCNC: 10.1 MG/DL
CHLORIDE SERPL-SCNC: 101 MMOL/L
CHOLEST SERPL-MCNC: 125 MG/DL
CHOLEST/HDLC SERPL: 2.6 RATIO
CO2 SERPL-SCNC: 21 MMOL/L
CREAT SERPL-MCNC: 0.84 MG/DL
EOSINOPHIL # BLD AUTO: 0.24 K/UL
EOSINOPHIL NFR BLD AUTO: 3.3 %
ESTIMATED AVERAGE GLUCOSE: 120 MG/DL
GLUCOSE SERPL-MCNC: 89 MG/DL
HBA1C MFR BLD HPLC: 5.8 %
HCT VFR BLD CALC: 43.1 %
HDLC SERPL-MCNC: 48 MG/DL
HGB BLD-MCNC: 14 G/DL
IMM GRANULOCYTES NFR BLD AUTO: 0.6 %
LDLC SERPL CALC-MCNC: 58 MG/DL
LYMPHOCYTES # BLD AUTO: 2.14 K/UL
LYMPHOCYTES NFR BLD AUTO: 29.5 %
MAN DIFF?: NORMAL
MCHC RBC-ENTMCNC: 29.6 PG
MCHC RBC-ENTMCNC: 32.5 GM/DL
MCV RBC AUTO: 91.1 FL
MONOCYTES # BLD AUTO: 0.51 K/UL
MONOCYTES NFR BLD AUTO: 7 %
NEUTROPHILS # BLD AUTO: 4.22 K/UL
NEUTROPHILS NFR BLD AUTO: 58.2 %
PLATELET # BLD AUTO: 278 K/UL
POTASSIUM SERPL-SCNC: 4.9 MMOL/L
PROT SERPL-MCNC: 7.1 G/DL
RBC # BLD: 4.73 M/UL
RBC # FLD: 13.7 %
SODIUM SERPL-SCNC: 138 MMOL/L
TRIGL SERPL-MCNC: 94 MG/DL
WBC # FLD AUTO: 7.25 K/UL

## 2020-06-30 ENCOUNTER — RX RENEWAL (OUTPATIENT)
Age: 65
End: 2020-06-30

## 2020-07-08 VITALS — HEIGHT: 64 IN | WEIGHT: 136 LBS | BODY MASS INDEX: 23.22 KG/M2

## 2020-07-08 NOTE — DATA REVIEWED
[Lung Cancer Screening] : Patient underwent lung cancer screening [2] : 2 [1] : 1 [TextBox_12] : 03/17 [TextBox_27] : 05/19

## 2020-07-08 NOTE — HISTORY OF PRESENT ILLNESS
[TextBox_13] : Referred by Dr. Parmjit Carty.\par \par Ms. MORALES is a 65 year old female with a history of CAD s/p stents, HTN, high cholesterol, PVD and emphysema.\par \par She was called today to review eligibility for Low-Dose CT lung cancer screening.  Reviewed and confirmed that the patient meets screening eligibility criteria:\par \par 65 years old \par \par Smoking Status: Current Smoker\par \par Number of pack(s) per day: 1\par Number of years smoked: 51\par Number of pack years smokin\par \par Ms. MORALES denies any symptoms of lung cancer, including new cough, change in cough, hemoptysis, and unintentional weight loss.\par \par Ms. MORALES denies any personal history of lung cancer.  No lung cancer in a first degree relative.  Denies any history of occupational exposures.

## 2020-07-13 ENCOUNTER — OUTPATIENT (OUTPATIENT)
Dept: OUTPATIENT SERVICES | Facility: HOSPITAL | Age: 65
LOS: 1 days | End: 2020-07-13
Payer: COMMERCIAL

## 2020-07-13 ENCOUNTER — APPOINTMENT (OUTPATIENT)
Dept: CT IMAGING | Facility: CLINIC | Age: 65
End: 2020-07-13
Payer: COMMERCIAL

## 2020-07-13 DIAGNOSIS — J43.9 EMPHYSEMA, UNSPECIFIED: ICD-10-CM

## 2020-07-13 DIAGNOSIS — Z41.9 ENCOUNTER FOR PROCEDURE FOR PURPOSES OTHER THAN REMEDYING HEALTH STATE, UNSPECIFIED: Chronic | ICD-10-CM

## 2020-07-13 DIAGNOSIS — Z95.5 PRESENCE OF CORONARY ANGIOPLASTY IMPLANT AND GRAFT: Chronic | ICD-10-CM

## 2020-07-13 DIAGNOSIS — Z90.89 ACQUIRED ABSENCE OF OTHER ORGANS: Chronic | ICD-10-CM

## 2020-07-13 DIAGNOSIS — Z90.49 ACQUIRED ABSENCE OF OTHER SPECIFIED PARTS OF DIGESTIVE TRACT: Chronic | ICD-10-CM

## 2020-07-13 DIAGNOSIS — I10 ESSENTIAL (PRIMARY) HYPERTENSION: ICD-10-CM

## 2020-07-13 PROCEDURE — G0297: CPT

## 2020-07-13 PROCEDURE — G0297: CPT | Mod: 26

## 2020-07-31 ENCOUNTER — APPOINTMENT (OUTPATIENT)
Dept: PULMONOLOGY | Facility: CLINIC | Age: 65
End: 2020-07-31
Payer: COMMERCIAL

## 2020-07-31 VITALS
HEIGHT: 63 IN | WEIGHT: 136 LBS | BODY MASS INDEX: 24.1 KG/M2 | SYSTOLIC BLOOD PRESSURE: 124 MMHG | RESPIRATION RATE: 16 BRPM | OXYGEN SATURATION: 96 % | DIASTOLIC BLOOD PRESSURE: 80 MMHG | HEART RATE: 78 BPM

## 2020-07-31 DIAGNOSIS — J20.9 ACUTE BRONCHITIS, UNSPECIFIED: ICD-10-CM

## 2020-07-31 DIAGNOSIS — J45.909 ACUTE BRONCHITIS, UNSPECIFIED: ICD-10-CM

## 2020-07-31 DIAGNOSIS — Z11.59 ENCOUNTER FOR SCREENING FOR OTHER VIRAL DISEASES: ICD-10-CM

## 2020-07-31 PROCEDURE — 99204 OFFICE O/P NEW MOD 45 MIN: CPT

## 2020-07-31 RX ORDER — METHYLPREDNISOLONE 4 MG/1
4 TABLET ORAL
Qty: 1 | Refills: 0 | Status: DISCONTINUED | COMMUNITY
Start: 2019-12-05 | End: 2020-07-31

## 2020-07-31 NOTE — REASON FOR VISIT
[Consultation] : a consultation [Abnormal CXR/ Chest CT] : an abnormal CXR/ chest CT [Cough] : cough [COPD] : COPD [Emphysema] : emphysema

## 2020-07-31 NOTE — PHYSICAL EXAM
[Normal Oropharynx] : normal oropharynx [No Acute Distress] : no acute distress [Normal Appearance] : normal appearance [No Neck Mass] : no neck mass [Normal Rate/Rhythm] : normal rate/rhythm [Normal S1, S2] : normal s1, s2 [No Resp Distress] : no resp distress [No Murmurs] : no murmurs [No Acc Muscle Use] : no acc muscle use [Normal Rhythm and Effort] : normal rhythm and effort [Rhonchi] : rhonchi [No Abnormalities] : no abnormalities [Normal Gait] : normal gait [Benign] : benign [No Clubbing] : no clubbing [No Edema] : no edema [No Cyanosis] : no cyanosis [No Focal Deficits] : no focal deficits [FROM] : FROM [Normal Color/ Pigmentation] : normal color/ pigmentation [Oriented x3] : oriented x3 [Normal Affect] : normal affect

## 2020-08-09 ENCOUNTER — RX RENEWAL (OUTPATIENT)
Age: 65
End: 2020-08-09

## 2020-08-11 ENCOUNTER — APPOINTMENT (OUTPATIENT)
Dept: PULMONOLOGY | Facility: CLINIC | Age: 65
End: 2020-08-11
Payer: COMMERCIAL

## 2020-08-11 PROCEDURE — 99211 OFF/OP EST MAY X REQ PHY/QHP: CPT

## 2020-08-12 ENCOUNTER — APPOINTMENT (OUTPATIENT)
Dept: NUCLEAR MEDICINE | Facility: CLINIC | Age: 65
End: 2020-08-12

## 2020-08-12 ENCOUNTER — OUTPATIENT (OUTPATIENT)
Dept: OUTPATIENT SERVICES | Facility: HOSPITAL | Age: 65
LOS: 1 days | End: 2020-08-12
Payer: COMMERCIAL

## 2020-08-12 DIAGNOSIS — Z41.9 ENCOUNTER FOR PROCEDURE FOR PURPOSES OTHER THAN REMEDYING HEALTH STATE, UNSPECIFIED: Chronic | ICD-10-CM

## 2020-08-12 DIAGNOSIS — J43.9 EMPHYSEMA, UNSPECIFIED: ICD-10-CM

## 2020-08-12 DIAGNOSIS — Z90.49 ACQUIRED ABSENCE OF OTHER SPECIFIED PARTS OF DIGESTIVE TRACT: Chronic | ICD-10-CM

## 2020-08-12 DIAGNOSIS — Z95.5 PRESENCE OF CORONARY ANGIOPLASTY IMPLANT AND GRAFT: Chronic | ICD-10-CM

## 2020-08-12 DIAGNOSIS — Z90.89 ACQUIRED ABSENCE OF OTHER ORGANS: Chronic | ICD-10-CM

## 2020-08-12 LAB — SARS-COV-2 N GENE NPH QL NAA+PROBE: NOT DETECTED

## 2020-08-12 PROCEDURE — 78815 PET IMAGE W/CT SKULL-THIGH: CPT | Mod: 26,PS

## 2020-08-14 ENCOUNTER — APPOINTMENT (OUTPATIENT)
Dept: PULMONOLOGY | Facility: CLINIC | Age: 65
End: 2020-08-14
Payer: COMMERCIAL

## 2020-08-14 VITALS
BODY MASS INDEX: 24.1 KG/M2 | WEIGHT: 136 LBS | SYSTOLIC BLOOD PRESSURE: 130 MMHG | OXYGEN SATURATION: 93 % | HEART RATE: 74 BPM | HEIGHT: 63 IN | DIASTOLIC BLOOD PRESSURE: 80 MMHG

## 2020-08-14 PROCEDURE — 94010 BREATHING CAPACITY TEST: CPT

## 2020-08-14 PROCEDURE — 85018 HEMOGLOBIN: CPT | Mod: QW

## 2020-08-14 PROCEDURE — 99214 OFFICE O/P EST MOD 30 MIN: CPT | Mod: 25

## 2020-08-14 PROCEDURE — 94729 DIFFUSING CAPACITY: CPT

## 2020-08-14 PROCEDURE — 94727 GAS DIL/WSHOT DETER LNG VOL: CPT

## 2020-08-14 RX ORDER — FLUTICASONE PROPIONATE 50 UG/1
50 SPRAY, METERED NASAL DAILY
Qty: 3 | Refills: 2 | Status: ACTIVE | COMMUNITY
Start: 2018-09-12

## 2020-08-14 NOTE — REASON FOR VISIT
[Follow-Up] : a follow-up visit [Abnormal CXR/ Chest CT] : an abnormal CXR/ chest CT [Shortness of Breath] : shortness of breath [COPD] : COPD [Nicotine Dependence] : nicotine dependence

## 2020-08-14 NOTE — PHYSICAL EXAM
[No Acute Distress] : no acute distress [No Neck Mass] : no neck mass [Normal Appearance] : normal appearance [Normal Oropharynx] : normal oropharynx [No Murmurs] : no murmurs [Normal Rate/Rhythm] : normal rate/rhythm [Normal S1, S2] : normal s1, s2 [No Resp Distress] : no resp distress [Clear to Auscultation Bilaterally] : clear to auscultation bilaterally [No Abnormalities] : no abnormalities [Benign] : benign [Normal Gait] : normal gait [No Clubbing] : no clubbing [No Edema] : no edema [No Cyanosis] : no cyanosis [FROM] : FROM [No Focal Deficits] : no focal deficits [Normal Color/ Pigmentation] : normal color/ pigmentation [Normal Affect] : normal affect [Oriented x3] : oriented x3

## 2020-08-16 ENCOUNTER — RX RENEWAL (OUTPATIENT)
Age: 65
End: 2020-08-16

## 2020-08-17 ENCOUNTER — APPOINTMENT (OUTPATIENT)
Dept: THORACIC SURGERY | Facility: CLINIC | Age: 65
End: 2020-08-17

## 2020-09-21 ENCOUNTER — RX RENEWAL (OUTPATIENT)
Age: 65
End: 2020-09-21

## 2020-10-08 ENCOUNTER — OUTPATIENT (OUTPATIENT)
Dept: OUTPATIENT SERVICES | Facility: HOSPITAL | Age: 65
LOS: 1 days | End: 2020-10-08

## 2020-10-08 ENCOUNTER — APPOINTMENT (OUTPATIENT)
Dept: CT IMAGING | Facility: CLINIC | Age: 65
End: 2020-10-08
Payer: COMMERCIAL

## 2020-10-08 DIAGNOSIS — Z41.9 ENCOUNTER FOR PROCEDURE FOR PURPOSES OTHER THAN REMEDYING HEALTH STATE, UNSPECIFIED: Chronic | ICD-10-CM

## 2020-10-08 DIAGNOSIS — Z90.89 ACQUIRED ABSENCE OF OTHER ORGANS: Chronic | ICD-10-CM

## 2020-10-08 DIAGNOSIS — Z95.5 PRESENCE OF CORONARY ANGIOPLASTY IMPLANT AND GRAFT: Chronic | ICD-10-CM

## 2020-10-08 DIAGNOSIS — Z90.49 ACQUIRED ABSENCE OF OTHER SPECIFIED PARTS OF DIGESTIVE TRACT: Chronic | ICD-10-CM

## 2020-10-08 DIAGNOSIS — J43.9 EMPHYSEMA, UNSPECIFIED: ICD-10-CM

## 2020-10-08 PROCEDURE — 71250 CT THORAX DX C-: CPT | Mod: 26

## 2020-10-13 ENCOUNTER — APPOINTMENT (OUTPATIENT)
Dept: INTERNAL MEDICINE | Facility: CLINIC | Age: 65
End: 2020-10-13
Payer: COMMERCIAL

## 2020-10-13 VITALS
SYSTOLIC BLOOD PRESSURE: 130 MMHG | WEIGHT: 138 LBS | TEMPERATURE: 97.5 F | BODY MASS INDEX: 24.45 KG/M2 | HEIGHT: 63 IN | DIASTOLIC BLOOD PRESSURE: 80 MMHG

## 2020-10-13 DIAGNOSIS — F17.200 NICOTINE DEPENDENCE, UNSPECIFIED, UNCOMPLICATED: ICD-10-CM

## 2020-10-13 PROCEDURE — 90662 IIV NO PRSV INCREASED AG IM: CPT

## 2020-10-13 PROCEDURE — 99214 OFFICE O/P EST MOD 30 MIN: CPT | Mod: 25

## 2020-10-13 PROCEDURE — 36415 COLL VENOUS BLD VENIPUNCTURE: CPT

## 2020-10-13 PROCEDURE — G0008: CPT

## 2020-10-13 RX ORDER — METFORMIN HYDROCHLORIDE 500 MG/1
500 TABLET, COATED ORAL TWICE DAILY
Qty: 180 | Refills: 0 | Status: COMPLETED | COMMUNITY
Start: 2019-05-27 | End: 2020-10-13

## 2020-10-13 NOTE — ASSESSMENT
[FreeTextEntry1] : Patient examined and adjustments to therapy for HBP IS not need to be [place]yne All labs reviewed with patient as well. Review of systems and physical exam discussed with patient. Care plan for future followups discussed with patient. All issues of health for the current year discussed in depth with patient who was conversant and all relevant issues addressed.\par \par Cholesterol levels discussed with patient. Compliance with oral medication were also addressed . Ideal LDL levels were  discussed with the patient. All issues regarding the implications of high cholesterol necessity for treatment were discussed with the patient who is conversant and all relevant questions were asked and answered. Patient on max dose Lipitor with excellent LDL\par \par Results of current evaluation discussed with patient. Medications were reviewed and all relevant labs as well as a 1C level and glucose levels were discussed in depth with  patient. Further options for ideal control were discussed, long-term complications of diabetes and screening for complications were also discussed. Patient was conversant and all relevant questions were asked and answered. Patient had low A1c of 5.8. Long conversation regarding current medications and after that she has decision-making have opted to change to jardiance 10 mg a day for both diabetes and to prevent second cardiac event and metformin was discontinued\par \par Patient is aware of potential side effects and will followup in 2 months\par \par Patient also had labs drawn high-dose influenza vaccination given shingrix discussed and will followup 2 monthsAll issues regarding patient's health and medical problems have been discussed. The patient understands and concurs with the treatment plan.

## 2020-10-15 LAB
ALBUMIN SERPL ELPH-MCNC: 4.8 G/DL
ALP BLD-CCNC: 114 U/L
ALT SERPL-CCNC: 19 U/L
ANION GAP SERPL CALC-SCNC: 16 MMOL/L
AST SERPL-CCNC: 21 U/L
BILIRUB SERPL-MCNC: 0.3 MG/DL
BUN SERPL-MCNC: 19 MG/DL
CALCIUM SERPL-MCNC: 10.2 MG/DL
CHLORIDE SERPL-SCNC: 103 MMOL/L
CHOLEST SERPL-MCNC: 138 MG/DL
CHOLEST/HDLC SERPL: 2.5 RATIO
CO2 SERPL-SCNC: 22 MMOL/L
CREAT SERPL-MCNC: 0.78 MG/DL
ESTIMATED AVERAGE GLUCOSE: 126 MG/DL
GLUCOSE SERPL-MCNC: 120 MG/DL
HBA1C MFR BLD HPLC: 6 %
HDLC SERPL-MCNC: 56 MG/DL
LDLC SERPL CALC-MCNC: 58 MG/DL
POTASSIUM SERPL-SCNC: 5.4 MMOL/L
PROT SERPL-MCNC: 7.1 G/DL
SODIUM SERPL-SCNC: 140 MMOL/L
TRIGL SERPL-MCNC: 120 MG/DL

## 2020-10-19 ENCOUNTER — NON-APPOINTMENT (OUTPATIENT)
Age: 65
End: 2020-10-19

## 2020-11-02 ENCOUNTER — APPOINTMENT (OUTPATIENT)
Dept: PULMONOLOGY | Facility: CLINIC | Age: 65
End: 2020-11-02

## 2020-11-10 ENCOUNTER — APPOINTMENT (OUTPATIENT)
Dept: PULMONOLOGY | Facility: CLINIC | Age: 65
End: 2020-11-10
Payer: COMMERCIAL

## 2020-11-10 VITALS — SYSTOLIC BLOOD PRESSURE: 120 MMHG | HEART RATE: 72 BPM | DIASTOLIC BLOOD PRESSURE: 74 MMHG | OXYGEN SATURATION: 96 %

## 2020-11-10 DIAGNOSIS — Z20.828 CONTACT WITH AND (SUSPECTED) EXPOSURE TO OTHER VIRAL COMMUNICABLE DISEASES: ICD-10-CM

## 2020-11-10 PROCEDURE — 99214 OFFICE O/P EST MOD 30 MIN: CPT

## 2020-11-10 PROCEDURE — 99072 ADDL SUPL MATRL&STAF TM PHE: CPT

## 2020-11-10 NOTE — REASON FOR VISIT
[Follow-Up] : a follow-up visit [Abnormal CXR/ Chest CT] : an abnormal CXR/ chest CT [Emphysema] : emphysema [Nicotine Dependence] : nicotine dependence [Family Member] : family member

## 2020-12-02 ENCOUNTER — RX RENEWAL (OUTPATIENT)
Age: 65
End: 2020-12-02

## 2020-12-15 ENCOUNTER — APPOINTMENT (OUTPATIENT)
Dept: INTERNAL MEDICINE | Facility: CLINIC | Age: 65
End: 2020-12-15
Payer: COMMERCIAL

## 2020-12-15 VITALS
HEIGHT: 63 IN | WEIGHT: 138 LBS | SYSTOLIC BLOOD PRESSURE: 120 MMHG | TEMPERATURE: 97.7 F | BODY MASS INDEX: 24.45 KG/M2 | DIASTOLIC BLOOD PRESSURE: 70 MMHG

## 2020-12-15 DIAGNOSIS — R73.03 PREDIABETES.: ICD-10-CM

## 2020-12-15 PROCEDURE — 99214 OFFICE O/P EST MOD 30 MIN: CPT | Mod: 25

## 2020-12-15 PROCEDURE — 36415 COLL VENOUS BLD VENIPUNCTURE: CPT

## 2020-12-15 PROCEDURE — 99072 ADDL SUPL MATRL&STAF TM PHE: CPT

## 2020-12-15 NOTE — ASSESSMENT
[FreeTextEntry1] : Cholesterol levels discussed with patient. Compliance with oral medication were also addressed . Ideal LDL levels were  discussed with the patient. All issues regarding the implications of high cholesterol necessity for treatment were discussed with the patient who is conversant and all relevant questions were asked and answered. LDL is appropriate and on target\par \par Patient examined and adjustments to therapy for HBP not All labs reviewed with patient as well. Review of systems and physical exam discussed with patient. Care plan for future followups discussed with patient. All issues of health for the current year discussed in depth with patient who was conversant and all relevant issues addressed.\par \par Results of current evaluation discussed with patient. Medications were reviewed and all relevant labs as well as a 1C level and glucose levels were discussed in depth with  patient. Further options for ideal control were discussed, long-term complications of diabetes and screening for complications were also discussed. Patient was conversant and all relevant questions were asked and answered. Patient will attempt to stay on jardiance but a significant polyuria persists will be discontinued and returned to metformin. The urine will be sent out today to rule out asymptomatic urinary tract infection and to assess degree of glycosuria\par \par All issues regarding patient's health and medical problems have been discussed. The patient understands and concurs with the treatment plan.

## 2020-12-15 NOTE — HISTORY OF PRESENT ILLNESS
[FreeTextEntry1] : Patient here for evaluation of multiple medical problems and new issues to be discussed\par  [de-identified] : Lung cancer r/o\par Issues with Jardiance - off cigs\par \par Patient doing very well and compliant with medication. Since being placed on jardiance is suffering from significant polyuria that keeps her up at night. She has no symptoms of urinary tract infection. She has recently seen by pulmonary continue off cigarettes and no evidence of lung cancer

## 2020-12-16 PROBLEM — Z12.11 ENCOUNTER FOR SCREENING COLONOSCOPY: Status: RESOLVED | Noted: 2018-11-29 | Resolved: 2020-12-16

## 2020-12-16 LAB
ANION GAP SERPL CALC-SCNC: 12 MMOL/L
APPEARANCE: CLEAR
BACTERIA: ABNORMAL
BILIRUBIN URINE: NEGATIVE
BLOOD URINE: NEGATIVE
BUN SERPL-MCNC: 22 MG/DL
CALCIUM SERPL-MCNC: 10.2 MG/DL
CHLORIDE SERPL-SCNC: 101 MMOL/L
CO2 SERPL-SCNC: 26 MMOL/L
COLOR: NORMAL
CREAT SERPL-MCNC: 1.05 MG/DL
ESTIMATED AVERAGE GLUCOSE: 123 MG/DL
GLUCOSE QUALITATIVE U: ABNORMAL
GLUCOSE SERPL-MCNC: 126 MG/DL
HBA1C MFR BLD HPLC: 5.9 %
HYALINE CASTS: 0 /LPF
KETONES URINE: NEGATIVE
LEUKOCYTE ESTERASE URINE: ABNORMAL
MICROSCOPIC-UA: NORMAL
NITRITE URINE: NEGATIVE
PH URINE: 6
POTASSIUM SERPL-SCNC: 4.1 MMOL/L
PROTEIN URINE: NEGATIVE
RED BLOOD CELLS URINE: 1 /HPF
SODIUM SERPL-SCNC: 139 MMOL/L
SPECIFIC GRAVITY URINE: 1.02
SQUAMOUS EPITHELIAL CELLS: 2 /HPF
URINE COMMENTS: NORMAL
UROBILINOGEN URINE: NORMAL
WHITE BLOOD CELLS URINE: 22 /HPF

## 2020-12-23 ENCOUNTER — NON-APPOINTMENT (OUTPATIENT)
Age: 65
End: 2020-12-23

## 2021-01-03 ENCOUNTER — RX RENEWAL (OUTPATIENT)
Age: 66
End: 2021-01-03

## 2021-01-07 ENCOUNTER — INPATIENT (INPATIENT)
Facility: HOSPITAL | Age: 66
LOS: 3 days | Discharge: HOME | DRG: 177 | End: 2021-01-11
Attending: HOSPITALIST | Admitting: HOSPITALIST
Payer: COMMERCIAL

## 2021-01-07 VITALS
HEIGHT: 64.5 IN | HEART RATE: 93 BPM | SYSTOLIC BLOOD PRESSURE: 105 MMHG | TEMPERATURE: 99 F | WEIGHT: 136.03 LBS | RESPIRATION RATE: 19 BRPM | OXYGEN SATURATION: 90 % | DIASTOLIC BLOOD PRESSURE: 72 MMHG

## 2021-01-07 DIAGNOSIS — Z95.5 PRESENCE OF CORONARY ANGIOPLASTY IMPLANT AND GRAFT: Chronic | ICD-10-CM

## 2021-01-07 DIAGNOSIS — Z90.49 ACQUIRED ABSENCE OF OTHER SPECIFIED PARTS OF DIGESTIVE TRACT: Chronic | ICD-10-CM

## 2021-01-07 DIAGNOSIS — Z41.9 ENCOUNTER FOR PROCEDURE FOR PURPOSES OTHER THAN REMEDYING HEALTH STATE, UNSPECIFIED: Chronic | ICD-10-CM

## 2021-01-07 DIAGNOSIS — Z90.89 ACQUIRED ABSENCE OF OTHER ORGANS: Chronic | ICD-10-CM

## 2021-01-07 DIAGNOSIS — U07.1 COVID-19: ICD-10-CM

## 2021-01-07 LAB
ALBUMIN SERPL ELPH-MCNC: 3.4 G/DL — SIGNIFICANT CHANGE UP (ref 3.3–5.2)
ALBUMIN SERPL ELPH-MCNC: 4 G/DL — SIGNIFICANT CHANGE UP (ref 3.3–5.2)
ALP SERPL-CCNC: 66 U/L — SIGNIFICANT CHANGE UP (ref 40–120)
ALP SERPL-CCNC: 86 U/L — SIGNIFICANT CHANGE UP (ref 40–120)
ALT FLD-CCNC: 16 U/L — SIGNIFICANT CHANGE UP
ALT FLD-CCNC: 21 U/L — SIGNIFICANT CHANGE UP
ANION GAP SERPL CALC-SCNC: 14 MMOL/L — SIGNIFICANT CHANGE UP (ref 5–17)
ANION GAP SERPL CALC-SCNC: 8 MMOL/L — SIGNIFICANT CHANGE UP (ref 5–17)
APPEARANCE UR: CLEAR — SIGNIFICANT CHANGE UP
AST SERPL-CCNC: 28 U/L — SIGNIFICANT CHANGE UP
AST SERPL-CCNC: 38 U/L — HIGH
B PERT DNA SPEC QL NAA+PROBE: SIGNIFICANT CHANGE UP
BACTERIA # UR AUTO: ABNORMAL
BASOPHILS # BLD AUTO: 0.02 K/UL — SIGNIFICANT CHANGE UP (ref 0–0.2)
BASOPHILS NFR BLD AUTO: 0.5 % — SIGNIFICANT CHANGE UP (ref 0–2)
BILIRUB DIRECT SERPL-MCNC: 0.1 MG/DL — SIGNIFICANT CHANGE UP (ref 0–0.3)
BILIRUB INDIRECT FLD-MCNC: 0.1 MG/DL — LOW (ref 0.2–1)
BILIRUB SERPL-MCNC: 0.2 MG/DL — LOW (ref 0.4–2)
BILIRUB SERPL-MCNC: 0.3 MG/DL — LOW (ref 0.4–2)
BILIRUB UR-MCNC: NEGATIVE — SIGNIFICANT CHANGE UP
BUN SERPL-MCNC: 31 MG/DL — HIGH (ref 8–20)
BUN SERPL-MCNC: 40 MG/DL — HIGH (ref 8–20)
C PNEUM DNA SPEC QL NAA+PROBE: SIGNIFICANT CHANGE UP
CALCIUM SERPL-MCNC: 8.1 MG/DL — LOW (ref 8.6–10.2)
CALCIUM SERPL-MCNC: 9.2 MG/DL — SIGNIFICANT CHANGE UP (ref 8.6–10.2)
CHLORIDE SERPL-SCNC: 105 MMOL/L — SIGNIFICANT CHANGE UP (ref 98–107)
CHLORIDE SERPL-SCNC: 96 MMOL/L — LOW (ref 98–107)
CO2 SERPL-SCNC: 19 MMOL/L — LOW (ref 22–29)
CO2 SERPL-SCNC: 19 MMOL/L — LOW (ref 22–29)
COLOR SPEC: YELLOW — SIGNIFICANT CHANGE UP
COMMENT - URINE: SIGNIFICANT CHANGE UP
CREAT SERPL-MCNC: 1.15 MG/DL — SIGNIFICANT CHANGE UP (ref 0.5–1.3)
CREAT SERPL-MCNC: 1.42 MG/DL — HIGH (ref 0.5–1.3)
CRP SERPL-MCNC: 9.11 MG/DL — HIGH (ref 0–0.4)
DIFF PNL FLD: ABNORMAL
EOSINOPHIL # BLD AUTO: 0 K/UL — SIGNIFICANT CHANGE UP (ref 0–0.5)
EOSINOPHIL NFR BLD AUTO: 0 % — SIGNIFICANT CHANGE UP (ref 0–6)
EPI CELLS # UR: SIGNIFICANT CHANGE UP
FERRITIN SERPL-MCNC: 546 NG/ML — HIGH (ref 15–150)
FLUAV H1 2009 PAND RNA SPEC QL NAA+PROBE: SIGNIFICANT CHANGE UP
FLUAV H1 RNA SPEC QL NAA+PROBE: SIGNIFICANT CHANGE UP
FLUAV H3 RNA SPEC QL NAA+PROBE: SIGNIFICANT CHANGE UP
FLUAV SUBTYP SPEC NAA+PROBE: SIGNIFICANT CHANGE UP
FLUBV RNA SPEC QL NAA+PROBE: SIGNIFICANT CHANGE UP
GLUCOSE SERPL-MCNC: 73 MG/DL — SIGNIFICANT CHANGE UP (ref 70–99)
GLUCOSE SERPL-MCNC: 92 MG/DL — SIGNIFICANT CHANGE UP (ref 70–99)
GLUCOSE UR QL: 250 MG/DL
HADV DNA SPEC QL NAA+PROBE: SIGNIFICANT CHANGE UP
HCOV PNL SPEC NAA+PROBE: SIGNIFICANT CHANGE UP
HCT VFR BLD CALC: 41.4 % — SIGNIFICANT CHANGE UP (ref 34.5–45)
HGB BLD-MCNC: 13.8 G/DL — SIGNIFICANT CHANGE UP (ref 11.5–15.5)
HMPV RNA SPEC QL NAA+PROBE: SIGNIFICANT CHANGE UP
HPIV1 RNA SPEC QL NAA+PROBE: SIGNIFICANT CHANGE UP
HPIV2 RNA SPEC QL NAA+PROBE: SIGNIFICANT CHANGE UP
HPIV3 RNA SPEC QL NAA+PROBE: SIGNIFICANT CHANGE UP
HPIV4 RNA SPEC QL NAA+PROBE: SIGNIFICANT CHANGE UP
IMM GRANULOCYTES NFR BLD AUTO: 0.5 % — SIGNIFICANT CHANGE UP (ref 0–1.5)
KETONES UR-MCNC: NEGATIVE — SIGNIFICANT CHANGE UP
LEUKOCYTE ESTERASE UR-ACNC: ABNORMAL
LYMPHOCYTES # BLD AUTO: 0.74 K/UL — LOW (ref 1–3.3)
LYMPHOCYTES # BLD AUTO: 18.5 % — SIGNIFICANT CHANGE UP (ref 13–44)
MCHC RBC-ENTMCNC: 28.9 PG — SIGNIFICANT CHANGE UP (ref 27–34)
MCHC RBC-ENTMCNC: 33.3 GM/DL — SIGNIFICANT CHANGE UP (ref 32–36)
MCV RBC AUTO: 86.6 FL — SIGNIFICANT CHANGE UP (ref 80–100)
MONOCYTES # BLD AUTO: 0.21 K/UL — SIGNIFICANT CHANGE UP (ref 0–0.9)
MONOCYTES NFR BLD AUTO: 5.3 % — SIGNIFICANT CHANGE UP (ref 2–14)
NEUTROPHILS # BLD AUTO: 3 K/UL — SIGNIFICANT CHANGE UP (ref 1.8–7.4)
NEUTROPHILS NFR BLD AUTO: 75.2 % — SIGNIFICANT CHANGE UP (ref 43–77)
NITRITE UR-MCNC: NEGATIVE — SIGNIFICANT CHANGE UP
PH UR: 5 — SIGNIFICANT CHANGE UP (ref 5–8)
PLATELET # BLD AUTO: 150 K/UL — SIGNIFICANT CHANGE UP (ref 150–400)
POTASSIUM SERPL-MCNC: 4.6 MMOL/L — SIGNIFICANT CHANGE UP (ref 3.5–5.3)
POTASSIUM SERPL-MCNC: 4.7 MMOL/L — SIGNIFICANT CHANGE UP (ref 3.5–5.3)
POTASSIUM SERPL-SCNC: 4.6 MMOL/L — SIGNIFICANT CHANGE UP (ref 3.5–5.3)
POTASSIUM SERPL-SCNC: 4.7 MMOL/L — SIGNIFICANT CHANGE UP (ref 3.5–5.3)
PROCALCITONIN SERPL-MCNC: 0.16 NG/ML — HIGH (ref 0.02–0.1)
PROT SERPL-MCNC: 6 G/DL — LOW (ref 6.6–8.7)
PROT SERPL-MCNC: 7.3 G/DL — SIGNIFICANT CHANGE UP (ref 6.6–8.7)
PROT UR-MCNC: 30 MG/DL
RAPID RVP RESULT: DETECTED
RBC # BLD: 4.78 M/UL — SIGNIFICANT CHANGE UP (ref 3.8–5.2)
RBC # FLD: 13 % — SIGNIFICANT CHANGE UP (ref 10.3–14.5)
RBC CASTS # UR COMP ASSIST: ABNORMAL /HPF (ref 0–4)
RV+EV RNA SPEC QL NAA+PROBE: SIGNIFICANT CHANGE UP
SARS-COV-2 RNA SPEC QL NAA+PROBE: DETECTED
SODIUM SERPL-SCNC: 129 MMOL/L — LOW (ref 135–145)
SODIUM SERPL-SCNC: 132 MMOL/L — LOW (ref 135–145)
SP GR SPEC: 1.01 — SIGNIFICANT CHANGE UP (ref 1.01–1.02)
TROPONIN T SERPL-MCNC: <0.01 NG/ML — SIGNIFICANT CHANGE UP (ref 0–0.06)
UROBILINOGEN FLD QL: NEGATIVE MG/DL — SIGNIFICANT CHANGE UP
WBC # BLD: 3.99 K/UL — SIGNIFICANT CHANGE UP (ref 3.8–10.5)
WBC # FLD AUTO: 3.99 K/UL — SIGNIFICANT CHANGE UP (ref 3.8–10.5)
WBC UR QL: ABNORMAL

## 2021-01-07 PROCEDURE — 99285 EMERGENCY DEPT VISIT HI MDM: CPT

## 2021-01-07 PROCEDURE — 99223 1ST HOSP IP/OBS HIGH 75: CPT

## 2021-01-07 PROCEDURE — 93010 ELECTROCARDIOGRAM REPORT: CPT

## 2021-01-07 PROCEDURE — 71045 X-RAY EXAM CHEST 1 VIEW: CPT | Mod: 26

## 2021-01-07 RX ORDER — DEXTROSE 50 % IN WATER 50 %
12.5 SYRINGE (ML) INTRAVENOUS ONCE
Refills: 0 | Status: DISCONTINUED | OUTPATIENT
Start: 2021-01-07 | End: 2021-01-11

## 2021-01-07 RX ORDER — ALBUTEROL 90 UG/1
2 AEROSOL, METERED ORAL EVERY 6 HOURS
Refills: 0 | Status: DISCONTINUED | OUTPATIENT
Start: 2021-01-07 | End: 2021-01-11

## 2021-01-07 RX ORDER — ONDANSETRON 8 MG/1
4 TABLET, FILM COATED ORAL EVERY 6 HOURS
Refills: 0 | Status: DISCONTINUED | OUTPATIENT
Start: 2021-01-07 | End: 2021-01-11

## 2021-01-07 RX ORDER — DEXTROSE 50 % IN WATER 50 %
25 SYRINGE (ML) INTRAVENOUS ONCE
Refills: 0 | Status: DISCONTINUED | OUTPATIENT
Start: 2021-01-07 | End: 2021-01-11

## 2021-01-07 RX ORDER — REMDESIVIR 5 MG/ML
200 INJECTION INTRAVENOUS EVERY 24 HOURS
Refills: 0 | Status: COMPLETED | OUTPATIENT
Start: 2021-01-07 | End: 2021-01-07

## 2021-01-07 RX ORDER — ONDANSETRON 8 MG/1
4 TABLET, FILM COATED ORAL ONCE
Refills: 0 | Status: COMPLETED | OUTPATIENT
Start: 2021-01-07 | End: 2021-01-07

## 2021-01-07 RX ORDER — SODIUM CHLORIDE 9 MG/ML
1000 INJECTION INTRAMUSCULAR; INTRAVENOUS; SUBCUTANEOUS ONCE
Refills: 0 | Status: COMPLETED | OUTPATIENT
Start: 2021-01-07 | End: 2021-01-07

## 2021-01-07 RX ORDER — GLUCAGON INJECTION, SOLUTION 0.5 MG/.1ML
1 INJECTION, SOLUTION SUBCUTANEOUS ONCE
Refills: 0 | Status: DISCONTINUED | OUTPATIENT
Start: 2021-01-07 | End: 2021-01-11

## 2021-01-07 RX ORDER — REMDESIVIR 5 MG/ML
100 INJECTION INTRAVENOUS EVERY 24 HOURS
Refills: 0 | Status: DISCONTINUED | OUTPATIENT
Start: 2021-01-08 | End: 2021-01-11

## 2021-01-07 RX ORDER — ASPIRIN/CALCIUM CARB/MAGNESIUM 324 MG
81 TABLET ORAL DAILY
Refills: 0 | Status: DISCONTINUED | OUTPATIENT
Start: 2021-01-07 | End: 2021-01-11

## 2021-01-07 RX ORDER — DEXTROSE 50 % IN WATER 50 %
15 SYRINGE (ML) INTRAVENOUS ONCE
Refills: 0 | Status: DISCONTINUED | OUTPATIENT
Start: 2021-01-07 | End: 2021-01-11

## 2021-01-07 RX ORDER — ATORVASTATIN CALCIUM 80 MG/1
80 TABLET, FILM COATED ORAL AT BEDTIME
Refills: 0 | Status: DISCONTINUED | OUTPATIENT
Start: 2021-01-07 | End: 2021-01-11

## 2021-01-07 RX ORDER — CLOPIDOGREL BISULFATE 75 MG/1
75 TABLET, FILM COATED ORAL DAILY
Refills: 0 | Status: DISCONTINUED | OUTPATIENT
Start: 2021-01-07 | End: 2021-01-11

## 2021-01-07 RX ORDER — INSULIN LISPRO 100/ML
VIAL (ML) SUBCUTANEOUS
Refills: 0 | Status: DISCONTINUED | OUTPATIENT
Start: 2021-01-07 | End: 2021-01-11

## 2021-01-07 RX ORDER — SACCHAROMYCES BOULARDII 250 MG
250 POWDER IN PACKET (EA) ORAL
Refills: 0 | Status: DISCONTINUED | OUTPATIENT
Start: 2021-01-07 | End: 2021-01-11

## 2021-01-07 RX ORDER — DEXAMETHASONE 0.5 MG/5ML
6 ELIXIR ORAL DAILY
Refills: 0 | Status: DISCONTINUED | OUTPATIENT
Start: 2021-01-07 | End: 2021-01-11

## 2021-01-07 RX ORDER — ALBUTEROL 90 UG/1
2 AEROSOL, METERED ORAL ONCE
Refills: 0 | Status: COMPLETED | OUTPATIENT
Start: 2021-01-07 | End: 2021-01-07

## 2021-01-07 RX ORDER — REMDESIVIR 5 MG/ML
INJECTION INTRAVENOUS
Refills: 0 | Status: DISCONTINUED | OUTPATIENT
Start: 2021-01-07 | End: 2021-01-11

## 2021-01-07 RX ORDER — ACETAMINOPHEN 500 MG
650 TABLET ORAL EVERY 6 HOURS
Refills: 0 | Status: DISCONTINUED | OUTPATIENT
Start: 2021-01-07 | End: 2021-01-11

## 2021-01-07 RX ORDER — SODIUM CHLORIDE 9 MG/ML
1000 INJECTION, SOLUTION INTRAVENOUS
Refills: 0 | Status: DISCONTINUED | OUTPATIENT
Start: 2021-01-07 | End: 2021-01-11

## 2021-01-07 RX ORDER — ENOXAPARIN SODIUM 100 MG/ML
40 INJECTION SUBCUTANEOUS DAILY
Refills: 0 | Status: DISCONTINUED | OUTPATIENT
Start: 2021-01-07 | End: 2021-01-11

## 2021-01-07 RX ORDER — ACETAMINOPHEN 500 MG
975 TABLET ORAL ONCE
Refills: 0 | Status: COMPLETED | OUTPATIENT
Start: 2021-01-07 | End: 2021-01-07

## 2021-01-07 RX ORDER — TIOTROPIUM BROMIDE 18 UG/1
1 CAPSULE ORAL; RESPIRATORY (INHALATION) DAILY
Refills: 0 | Status: DISCONTINUED | OUTPATIENT
Start: 2021-01-07 | End: 2021-01-11

## 2021-01-07 RX ADMIN — ATORVASTATIN CALCIUM 80 MILLIGRAM(S): 80 TABLET, FILM COATED ORAL at 22:50

## 2021-01-07 RX ADMIN — SODIUM CHLORIDE 1000 MILLILITER(S): 9 INJECTION INTRAMUSCULAR; INTRAVENOUS; SUBCUTANEOUS at 11:00

## 2021-01-07 RX ADMIN — ONDANSETRON 4 MILLIGRAM(S): 8 TABLET, FILM COATED ORAL at 09:30

## 2021-01-07 RX ADMIN — ALBUTEROL 2 PUFF(S): 90 AEROSOL, METERED ORAL at 09:30

## 2021-01-07 RX ADMIN — REMDESIVIR 500 MILLIGRAM(S): 5 INJECTION INTRAVENOUS at 22:50

## 2021-01-07 RX ADMIN — Medication 975 MILLIGRAM(S): at 11:15

## 2021-01-07 RX ADMIN — Medication 650 MILLIGRAM(S): at 22:51

## 2021-01-07 RX ADMIN — ONDANSETRON 4 MILLIGRAM(S): 8 TABLET, FILM COATED ORAL at 16:52

## 2021-01-07 RX ADMIN — SODIUM CHLORIDE 1000 MILLILITER(S): 9 INJECTION INTRAMUSCULAR; INTRAVENOUS; SUBCUTANEOUS at 09:54

## 2021-01-07 NOTE — ED PROVIDER NOTE - ATTENDING CONTRIBUTION TO CARE
Danielle: I performed a face to face bedside interview with patient regarding history of present illness, review of symptoms and past medical history. I completed an independent physical exam.  I have discussed patient's plan of care with advanced care provider.   I agree with note as stated above including HISTORY OF PRESENT ILLNESS, HIV, PAST MEDICAL/SURGICAL/FAMILY/SOCIAL HISTORY, ALLERGIES AND HOME MEDICATIONS, REVIEW OF SYSTEMS, PHYSICAL EXAM, MEDICAL DECISION MAKING and any PROGRESS NOTES during the time I functioned as the attending physician for this patient  unless otherwise noted. My brief assessment is as follows: 65F h/o cad, emphysema, HTN, HLD, p/w fever, myalgias, SOB x 2 days. +covid exposure. Patient intermittently hypoxic to low 90s. Plan for cxr, covid swab, covid labs, reassess, if remains hypoxic will likely require admission.

## 2021-01-07 NOTE — CONSULT NOTE ADULT - SUBJECTIVE AND OBJECTIVE BOX
INFECTIOUS DISEASES AND INTERNAL MEDICINE at [place]  =======================================================  Parmjit Nazario MD  Diplomates American Board of Internal Medicine and Infectious Diseases  Telephone [phone]  Fax            [phone]  =======================================================    JERONIMO [id number]      HPI:  65F who presented with a two day history of increasing dyspnea and non-productive cough. She also reported episodes of fevers, chills, malaise, and myalgias. The patient had been exposed to other family members who had tested positive for COVID-19. She had quarantined at home, and had previously been tested negative multiple times. She had taken ibuprofen at home with minimal relief of symptoms. She denied any similar symptoms in the past and noted that he dyspnea was worse with ambulation. In the emergency department, the patient was noted to have hypoxia with ambulation. She denied any associated chest pain, palpitations, orthopnea, or lower extremity edema. She did report mild nausea and poor appetite and poor oral intake over the past two days.   COVID 19 POSTIVE HERE  ASKED TO EVALUATE FROM ID STANDPOINT       PAST MEDICAL & SURGICAL HISTORY:  Emphysema/COPD    HTN (hypertension)    HLD (hyperlipidemia)    Ischemia  PET scan small area of mild ischemia involving the mid to apical inferolateral wall  Oct. 2016    PRICE (dyspnea on exertion)    PVD (peripheral vascular disease)  Terrence iliac stents 2006    CAD (coronary artery disease)  Taxus stent RCA 2008    Elective surgery  unilateral oopherectomy    History of cholecystectomy    History of tonsillectomy    Elective surgery  bilateral iliac stents    History of right coronary artery stent placement        ANTIBIOTICS  remdesivir  IVPB 200 milliGRAM(s) IV Intermittent every 24 hours  remdesivir  IVPB   IV Intermittent   trimethoprim  160 mG/sulfamethoxazole 800 mG 1 Tablet(s) Oral two times a day      Allergies    Ceclor (Swelling)  codeine (Vomiting)  penicillin (Unknown)    Intolerances        SOCIAL HISTORY:     FAMILY HX   FAMILY HISTORY:      Vital Signs Last 24 Hrs  T(C): 37.5 (2021 17:52), Max: 38.9 (2021 11:15)  T(F): 99.5 (2021 17:52), Max: 102 (2021 11:15)  HR: 118 (2021 17:52) (85 - 118)  BP: 141/82 (2021 17:52) (105/72 - 141/82)  BP(mean): --  RR: 19 (2021 17:52) (19 - 24)  SpO2: 95% (2021 17:52) (89% - 98%)  Drug Dosing Weight  Height (cm): 163.8 (2021 07:47)  Weight (kg): 61.7 (2021 07:47)  BMI (kg/m2): 23 (2021 07:47)  BSA (m2): 1.67 (2021 07:47)      REVIEW OF SYSTEMS:    CONSTITUTIONAL:  As per HPI.    HEENT:  Eyes:  No diplopia or blurred vision. ENT:  No earache, sore throat or runny nose.    CARDIOVASCULAR:  No pressure, squeezing, strangling, tightness, heaviness or aching about the chest, neck, axilla or epigastrium.    RESPIRATORY:    cough, shortness of breath,     GASTROINTESTINAL:  No nausea, vomiting or diarrhea.    GENITOURINARY:  No dysuria, frequency or urgency.    MUSCULOSKELETAL:  As per HPI.    SKIN:  No change in skin, hair or nails.    NEUROLOGIC:  No paresthesias, fasciculations, seizures or weakness.                  PHYSICAL EXAMINATION:    GENERAL: The patient is a well-developed, well-nourished _____in no apparent distress. ___ is alert and oriented x3.    VITAL SIGNS: T(C): 37.5 (21 @ 17:52), Max: 38.9 (21 @ 11:15)  HR: 118 (21 @ 17:52) (85 - 118)  BP: 141/82 (21 @ 17:52) (105/72 - 141/82)  RR: 19 (21 @ 17:52) (19 - 24)  SpO2: 95% (21 @ 17:52) (89% - 98%)  Wt(kg): --    HEENT: Head is normocephalic and atraumatic.  ANICTERIC  NECK: Supple. No carotid bruits.  No lymphadenopathy or thyromegaly.    LUNGS:COARSE BREATH SOUNDS    HEART: Regular rate and rhythm without murmur.    ABDOMEN: Soft, nontender, and nondistended.  Positive bowel sounds.  No hepatosplenomegaly was noted. NO REBOUND NO GUARDING    EXTREMITIES: NO EDEMA NO ERYTHEMA    NEUROLOGIC: NON FOCAL      SKIN: No ulceration or induration present. NO RASH        BLOOD CULTURES       URINE CX          LABS:                        13.8   3.99  )-----------( 150      ( 2021 09:24 )             41.4         132<L>  |  105  |  31.0<H>  ----------------------------<  73  4.6   |  19.0<L>  |  1.15    Ca    8.1<L>      2021 13:31    TPro  6.0<L>  /  Alb  3.4  /  TBili  0.2<L>  /  DBili  0.1  /  AST  28  /  ALT  16  /  AlkPhos  66        Urinalysis Basic - ( 2021 11:31 )    Color: Yellow / Appearance: Clear / S.015 / pH: x  Gluc: x / Ketone: Negative  / Bili: Negative / Urobili: Negative mg/dL   Blood: x / Protein: 30 mg/dL / Nitrite: Negative   Leuk Esterase: Moderate / RBC: 3-5 /HPF / WBC 11-25   Sq Epi: x / Non Sq Epi: Occasional / Bacteria: Occasional        RADIOLOGY & ADDITIONAL STUDIES:      ASSESSMENT/PLAN                  RED ABDULLAHI MD

## 2021-01-07 NOTE — ED PROVIDER NOTE - PSH
Elective surgery  unilateral oopherectomy  Elective surgery  bilateral iliac stents  History of cholecystectomy    History of right coronary artery stent placement    History of tonsillectomy

## 2021-01-07 NOTE — ED PROVIDER NOTE - PHYSICAL EXAMINATION
Vital signs noted, see flowsheet.  General: NAD, well appearing and non-toxic.  HEENT: NC/AT. MMM. Throat without erythema or exudate. Conjunctiva and sclera clear b/l.  EOMI. PERRL.  Neck: Soft and supple, full ROM without pain. No meningeal signs  Cardiac: RRR. +S1/S2. Peripheral pulses 2+ and symmetric b/l. Capillary refill less than 2 seconds  Respiratory: Tachypneic, lungs decreased air movement. No retractions or accessory muscle use  Abdomen: Soft, NTND. No guarding  Back: Spine midline and non-tender. No CVAT.  Skin: Warm, dry, no rash  Neuro: Awake, alert and oriented to person/place/time/situation.   Psych: Normal affect

## 2021-01-07 NOTE — ED PROVIDER NOTE - PROGRESS NOTE DETAILS
Pt hypoxic on RA at rest to 89%, + COVID, meets admission criteria. Pt agreeable to admission. Medicine team accepted admission, all further care and disposition transferred to medicine team.

## 2021-01-07 NOTE — ED ADULT NURSE NOTE - OBJECTIVE STATEMENT
Pt A&OX3, amb ad ottoniel, c/o sob and cough with fevers X 2 days.  Pt states she has been exposed to COVID 19.  Clear bsb, abd soft nondistended, nontender, moving all ext well.

## 2021-01-07 NOTE — CONSULT NOTE ADULT - ASSESSMENT
65F who presented with a two day history of increasing dyspnea and non-productive cough. She also reported episodes of fevers, chills, malaise, and myalgias. The patient had been exposed to other family members who had tested positive for COVID-19. She had quarantined at home, and had previously been tested negative multiple times. She had taken ibuprofen at home with minimal relief of symptoms. She denied any similar symptoms in the past and noted that he dyspnea was worse with ambulation. In the emergency department, the patient was noted to have hypoxia with ambulation. She denied any associated chest pain, palpitations, orthopnea, or lower extremity edema. She did report mild nausea and poor appetite and poor oral intake over the past two days. Sh  COVID 19 POSITIVE HERE  PT IS REQUIRING SUPPLEMENTAL 02   AGREE WITH REMDESIVIR AND DECADRON  PRONE POSITIONING  CHECK INFALMAMMAOTRY MARKERS  WILL FOLLOWUP WITH FURTHER RECOMMENDATIONS

## 2021-01-07 NOTE — H&P ADULT - NSICDXPASTSURGICALHX_GEN_ALL_CORE_FT
PAST SURGICAL HISTORY:  Elective surgery bilateral iliac stents    Elective surgery unilateral oopherectomy    History of cholecystectomy     History of right coronary artery stent placement     History of tonsillectomy

## 2021-01-07 NOTE — ED PROVIDER NOTE - CLINICAL SUMMARY MEDICAL DECISION MAKING FREE TEXT BOX
66 y/o F with PMHx CAD s/p stent, emphysema, HLD, HTN, PVD presents to ED c/o generalized malaise, intermittent fever/chills, body aches and increasing shortness of breath over past 2 days. PT reports she was exposed to COVID on 12/24/2020. Pt states she had negative test on 12/30 but is now experiencing symptoms. Pt also notes nausea this morning. Patient has been taking Advil without relief. Likely COVID  -Will check labs, CXR, monitor pulse ox/ cardiac monitor and likely admission to hospital

## 2021-01-07 NOTE — ED PROVIDER NOTE - OBJECTIVE STATEMENT
66 y/o F with PMHx CAD s/p stent, emphysema, HLD, HTN, PVD presents to ED c/o generalized malaise, intermittent fever/chills, body aches and increasing shortness of breath over past 2 days. PT reports she was exposed to COVID on 12/24/2020. Pt states she had negative test on 12/30 but is now experiencing symptoms. Pt also notes nausea this morning. Patient has been taking Advil without relief.

## 2021-01-07 NOTE — H&P ADULT - ASSESSMENT
65F with a history of COPD, coronary artery disease, hypertension, and pre-diabetes who presented with fevers, chills, and increasing dyspnea found to have hypoxia with positive COVID-19 PCR.    COVID-19 pneumonia / Acute hypoxic respiratory failure - Supplemental oxygen as needed with titration as tolerated. Discussed with Infectious Disease, for initiation of remdesivir and dexamethasone. Enoxaparin for venous thromboembolism prophylaxis.    COPD - No wheezing or evidence of bronchospasm on examination. To continue on inhaled bronchodilator therapy as needed.    Coronary artery disease - On aspirin, clopidogrel, and atorvastatin. No chest pain of signs of heart failure.    Hypertension - Close blood pressure monitoring.    Urinary tract infection - On empiric antibiotics. Urine culture results to be reviewed when available.    Acute kidney injury / Hyponatremia - Suspect secondary to poor oral intake. Improved with intravenous fluids. Repeat laboratory studies ordered to monitor.    Pre-diabetes - The patient was noted to be on empagliflozin at home. For insulin coverage as needed as the patient will be on dexamethasone.

## 2021-01-07 NOTE — H&P ADULT - HISTORY OF PRESENT ILLNESS
65F who presented with a two day history of increasing dyspnea and non-productive cough. She also reported episodes of fevers, chills, malaise, and myalgias. The patient had been exposed to other family members who had tested positive for COVID-19. She had quarantined at home, and had previously been tested negative multiple times. She had taken ibuprofen at home with minimal relief of symptoms. She denied any similar symptoms in the past and noted that he dyspnea was worse with ambulation. In the emergency department, the patient was noted to have hypoxia with ambulation. She denied any associated chest pain, palpitations, orthopnea, or lower extremity edema.  65F who presented with a two day history of increasing dyspnea and non-productive cough. She also reported episodes of fevers, chills, malaise, and myalgias. The patient had been exposed to other family members who had tested positive for COVID-19. She had quarantined at home, and had previously been tested negative multiple times. She had taken ibuprofen at home with minimal relief of symptoms. She denied any similar symptoms in the past and noted that he dyspnea was worse with ambulation. In the emergency department, the patient was noted to have hypoxia with ambulation. She denied any associated chest pain, palpitations, orthopnea, or lower extremity edema. She did report mild nausea and poor appetite and poor oral intake over the past two days. She admitted to urinary frequency but denied any dysuria or hematuria.

## 2021-01-07 NOTE — H&P ADULT - NSHPPHYSICALEXAM_GEN_ALL_CORE
Vital Signs Last 24 Hrs  T(C): 36.6 (07 Jan 2021 16:07), Max: 38.9 (07 Jan 2021 11:15)  T(F): 97.9 (07 Jan 2021 16:07), Max: 102 (07 Jan 2021 11:15)  HR: 92 (07 Jan 2021 16:07) (85 - 94)  BP: 141/64 (07 Jan 2021 16:07) (105/72 - 141/64)  BP(mean): --  RR: 20 (07 Jan 2021 16:07) (19 - 24)  SpO2: 94% (07 Jan 2021 16:07) (89% - 98%)    General appearance: No acute distress, Awake, Alert  HEENT: Normocephalic, Atraumatic, Conjunctiva clear, EOMI  Neck: Supple, No JVD, No tenderness  Lungs: Breath sound equal bilaterally, No wheezes, No rales  Cardiovascular: S1S2, Regular rhythm  Abdomen: Soft, Nontender, Nondistended, No guarding/rebound, Positive bowel sounds  Extremities: No clubbing, No cyanosis, No edema, No calf tenderness  Neuro: Strength equal bilaterally, No tremors  Psychiatric: Appropriate mood, Normal affect

## 2021-01-07 NOTE — H&P ADULT - NSICDXPASTMEDICALHX_GEN_ALL_CORE_FT
PAST MEDICAL HISTORY:  CAD (coronary artery disease) Taxus stent RCA 2008    PRICE (dyspnea on exertion)     Emphysema/COPD     HLD (hyperlipidemia)     HTN (hypertension)     Ischemia PET scan small area of mild ischemia involving the mid to apical inferolateral wall  Oct. 2016    PVD (peripheral vascular disease) Terrence iliac stents 2006

## 2021-01-07 NOTE — ED ADULT TRIAGE NOTE - CHIEF COMPLAINT QUOTE
Patient states that she has been having intermittent fevers/chills, body aches and SOB x 2 days. Patient states that she was exposed to COVID on xmas fletcher. Patient has been taking Advil without relief.

## 2021-01-07 NOTE — ED PROVIDER NOTE - CARE PLAN
Principal Discharge DX:	Acute hypoxemic respiratory failure due to COVID-19  Secondary Diagnosis:	UTI (urinary tract infection)

## 2021-01-08 LAB
A1C WITH ESTIMATED AVERAGE GLUCOSE RESULT: 5.8 % — HIGH (ref 4–5.6)
ALBUMIN SERPL ELPH-MCNC: 3.5 G/DL — SIGNIFICANT CHANGE UP (ref 3.3–5.2)
ALP SERPL-CCNC: 65 U/L — SIGNIFICANT CHANGE UP (ref 40–120)
ALT FLD-CCNC: 17 U/L — SIGNIFICANT CHANGE UP
ANION GAP SERPL CALC-SCNC: 10 MMOL/L — SIGNIFICANT CHANGE UP (ref 5–17)
AST SERPL-CCNC: 35 U/L — HIGH
BILIRUB DIRECT SERPL-MCNC: 0.1 MG/DL — SIGNIFICANT CHANGE UP (ref 0–0.3)
BILIRUB INDIRECT FLD-MCNC: 0.2 MG/DL — SIGNIFICANT CHANGE UP (ref 0.2–1)
BILIRUB SERPL-MCNC: 0.3 MG/DL — LOW (ref 0.4–2)
BUN SERPL-MCNC: 21 MG/DL — HIGH (ref 8–20)
CALCIUM SERPL-MCNC: 8.4 MG/DL — LOW (ref 8.6–10.2)
CHLORIDE SERPL-SCNC: 99 MMOL/L — SIGNIFICANT CHANGE UP (ref 98–107)
CO2 SERPL-SCNC: 21 MMOL/L — LOW (ref 22–29)
CREAT SERPL-MCNC: 0.89 MG/DL — SIGNIFICANT CHANGE UP (ref 0.5–1.3)
CREAT SERPL-MCNC: 0.91 MG/DL — SIGNIFICANT CHANGE UP (ref 0.5–1.3)
CULTURE RESULTS: SIGNIFICANT CHANGE UP
ESTIMATED AVERAGE GLUCOSE: 120 MG/DL — HIGH (ref 68–114)
GLUCOSE BLDC GLUCOMTR-MCNC: 100 MG/DL — HIGH (ref 70–99)
GLUCOSE BLDC GLUCOMTR-MCNC: 122 MG/DL — HIGH (ref 70–99)
GLUCOSE BLDC GLUCOMTR-MCNC: 143 MG/DL — HIGH (ref 70–99)
GLUCOSE BLDC GLUCOMTR-MCNC: 97 MG/DL — SIGNIFICANT CHANGE UP (ref 70–99)
GLUCOSE SERPL-MCNC: 82 MG/DL — SIGNIFICANT CHANGE UP (ref 70–99)
HCV AB S/CO SERPL IA: 0.19 S/CO — SIGNIFICANT CHANGE UP (ref 0–0.99)
HCV AB SERPL-IMP: SIGNIFICANT CHANGE UP
POTASSIUM SERPL-MCNC: 4.7 MMOL/L — SIGNIFICANT CHANGE UP (ref 3.5–5.3)
POTASSIUM SERPL-SCNC: 4.7 MMOL/L — SIGNIFICANT CHANGE UP (ref 3.5–5.3)
PROT SERPL-MCNC: 6.5 G/DL — LOW (ref 6.6–8.7)
SARS-COV-2 IGG SERPL QL IA: NEGATIVE — SIGNIFICANT CHANGE UP
SARS-COV-2 IGM SERPL IA-ACNC: 0.07 INDEX — SIGNIFICANT CHANGE UP
SODIUM SERPL-SCNC: 130 MMOL/L — LOW (ref 135–145)
SPECIMEN SOURCE: SIGNIFICANT CHANGE UP

## 2021-01-08 PROCEDURE — 99233 SBSQ HOSP IP/OBS HIGH 50: CPT

## 2021-01-08 PROCEDURE — 99232 SBSQ HOSP IP/OBS MODERATE 35: CPT

## 2021-01-08 RX ADMIN — Medication 1 TABLET(S): at 17:30

## 2021-01-08 RX ADMIN — Medication 6 MILLIGRAM(S): at 05:09

## 2021-01-08 RX ADMIN — REMDESIVIR 500 MILLIGRAM(S): 5 INJECTION INTRAVENOUS at 21:18

## 2021-01-08 RX ADMIN — ATORVASTATIN CALCIUM 80 MILLIGRAM(S): 80 TABLET, FILM COATED ORAL at 21:20

## 2021-01-08 RX ADMIN — CLOPIDOGREL BISULFATE 75 MILLIGRAM(S): 75 TABLET, FILM COATED ORAL at 12:54

## 2021-01-08 RX ADMIN — ONDANSETRON 4 MILLIGRAM(S): 8 TABLET, FILM COATED ORAL at 05:15

## 2021-01-08 RX ADMIN — Medication 250 MILLIGRAM(S): at 17:30

## 2021-01-08 RX ADMIN — Medication 81 MILLIGRAM(S): at 16:03

## 2021-01-08 RX ADMIN — ENOXAPARIN SODIUM 40 MILLIGRAM(S): 100 INJECTION SUBCUTANEOUS at 12:54

## 2021-01-08 RX ADMIN — TIOTROPIUM BROMIDE 1 CAPSULE(S): 18 CAPSULE ORAL; RESPIRATORY (INHALATION) at 16:03

## 2021-01-08 RX ADMIN — Medication 250 MILLIGRAM(S): at 05:09

## 2021-01-08 RX ADMIN — Medication 1 TABLET(S): at 05:09

## 2021-01-09 LAB
ALBUMIN SERPL ELPH-MCNC: 3.8 G/DL — SIGNIFICANT CHANGE UP (ref 3.3–5.2)
ALP SERPL-CCNC: 67 U/L — SIGNIFICANT CHANGE UP (ref 40–120)
ALT FLD-CCNC: 19 U/L — SIGNIFICANT CHANGE UP
AST SERPL-CCNC: 42 U/L — HIGH
BILIRUB DIRECT SERPL-MCNC: 0.1 MG/DL — SIGNIFICANT CHANGE UP (ref 0–0.3)
BILIRUB INDIRECT FLD-MCNC: 0.2 MG/DL — SIGNIFICANT CHANGE UP (ref 0.2–1)
BILIRUB SERPL-MCNC: 0.3 MG/DL — LOW (ref 0.4–2)
CREAT SERPL-MCNC: 0.93 MG/DL — SIGNIFICANT CHANGE UP (ref 0.5–1.3)
GLUCOSE BLDC GLUCOMTR-MCNC: 113 MG/DL — HIGH (ref 70–99)
GLUCOSE BLDC GLUCOMTR-MCNC: 132 MG/DL — HIGH (ref 70–99)
GLUCOSE BLDC GLUCOMTR-MCNC: 134 MG/DL — HIGH (ref 70–99)
GLUCOSE BLDC GLUCOMTR-MCNC: 171 MG/DL — HIGH (ref 70–99)
PROT SERPL-MCNC: 6.7 G/DL — SIGNIFICANT CHANGE UP (ref 6.6–8.7)

## 2021-01-09 PROCEDURE — 99233 SBSQ HOSP IP/OBS HIGH 50: CPT

## 2021-01-09 PROCEDURE — 99232 SBSQ HOSP IP/OBS MODERATE 35: CPT

## 2021-01-09 RX ADMIN — Medication 250 MILLIGRAM(S): at 16:39

## 2021-01-09 RX ADMIN — Medication 1: at 16:40

## 2021-01-09 RX ADMIN — Medication 1 TABLET(S): at 04:39

## 2021-01-09 RX ADMIN — Medication 250 MILLIGRAM(S): at 04:39

## 2021-01-09 RX ADMIN — CLOPIDOGREL BISULFATE 75 MILLIGRAM(S): 75 TABLET, FILM COATED ORAL at 11:38

## 2021-01-09 RX ADMIN — Medication 1 TABLET(S): at 16:39

## 2021-01-09 RX ADMIN — Medication 1: at 11:40

## 2021-01-09 RX ADMIN — REMDESIVIR 500 MILLIGRAM(S): 5 INJECTION INTRAVENOUS at 21:20

## 2021-01-09 RX ADMIN — Medication 6 MILLIGRAM(S): at 04:39

## 2021-01-09 RX ADMIN — TIOTROPIUM BROMIDE 1 CAPSULE(S): 18 CAPSULE ORAL; RESPIRATORY (INHALATION) at 08:15

## 2021-01-09 RX ADMIN — ENOXAPARIN SODIUM 40 MILLIGRAM(S): 100 INJECTION SUBCUTANEOUS at 11:39

## 2021-01-09 RX ADMIN — ATORVASTATIN CALCIUM 80 MILLIGRAM(S): 80 TABLET, FILM COATED ORAL at 21:22

## 2021-01-09 RX ADMIN — Medication 81 MILLIGRAM(S): at 11:38

## 2021-01-10 LAB
ALBUMIN SERPL ELPH-MCNC: 3.5 G/DL — SIGNIFICANT CHANGE UP (ref 3.3–5.2)
ALBUMIN SERPL ELPH-MCNC: 3.6 G/DL — SIGNIFICANT CHANGE UP (ref 3.3–5.2)
ALP SERPL-CCNC: 63 U/L — SIGNIFICANT CHANGE UP (ref 40–120)
ALP SERPL-CCNC: 65 U/L — SIGNIFICANT CHANGE UP (ref 40–120)
ALT FLD-CCNC: 21 U/L — SIGNIFICANT CHANGE UP
ALT FLD-CCNC: 21 U/L — SIGNIFICANT CHANGE UP
ANION GAP SERPL CALC-SCNC: 12 MMOL/L — SIGNIFICANT CHANGE UP (ref 5–17)
AST SERPL-CCNC: 48 U/L — HIGH
AST SERPL-CCNC: 49 U/L — HIGH
BASOPHILS # BLD AUTO: 0.01 K/UL — SIGNIFICANT CHANGE UP (ref 0–0.2)
BASOPHILS NFR BLD AUTO: 0.2 % — SIGNIFICANT CHANGE UP (ref 0–2)
BILIRUB DIRECT SERPL-MCNC: 0.1 MG/DL — SIGNIFICANT CHANGE UP (ref 0–0.3)
BILIRUB INDIRECT FLD-MCNC: 0.2 MG/DL — SIGNIFICANT CHANGE UP (ref 0.2–1)
BILIRUB SERPL-MCNC: 0.3 MG/DL — LOW (ref 0.4–2)
BILIRUB SERPL-MCNC: 0.3 MG/DL — LOW (ref 0.4–2)
BUN SERPL-MCNC: 30 MG/DL — HIGH (ref 8–20)
CALCIUM SERPL-MCNC: 8.9 MG/DL — SIGNIFICANT CHANGE UP (ref 8.6–10.2)
CHLORIDE SERPL-SCNC: 98 MMOL/L — SIGNIFICANT CHANGE UP (ref 98–107)
CO2 SERPL-SCNC: 22 MMOL/L — SIGNIFICANT CHANGE UP (ref 22–29)
CREAT SERPL-MCNC: 0.93 MG/DL — SIGNIFICANT CHANGE UP (ref 0.5–1.3)
CRP SERPL-MCNC: 4.17 MG/DL — HIGH (ref 0–0.4)
D DIMER BLD IA.RAPID-MCNC: 155 NG/ML DDU — SIGNIFICANT CHANGE UP
EOSINOPHIL # BLD AUTO: 0 K/UL — SIGNIFICANT CHANGE UP (ref 0–0.5)
EOSINOPHIL NFR BLD AUTO: 0 % — SIGNIFICANT CHANGE UP (ref 0–6)
FERRITIN SERPL-MCNC: 1635 NG/ML — HIGH (ref 15–150)
GLUCOSE BLDC GLUCOMTR-MCNC: 114 MG/DL — HIGH (ref 70–99)
GLUCOSE BLDC GLUCOMTR-MCNC: 115 MG/DL — HIGH (ref 70–99)
GLUCOSE BLDC GLUCOMTR-MCNC: 116 MG/DL — HIGH (ref 70–99)
GLUCOSE BLDC GLUCOMTR-MCNC: 131 MG/DL — HIGH (ref 70–99)
GLUCOSE SERPL-MCNC: 89 MG/DL — SIGNIFICANT CHANGE UP (ref 70–99)
HCT VFR BLD CALC: 40.6 % — SIGNIFICANT CHANGE UP (ref 34.5–45)
HGB BLD-MCNC: 13.5 G/DL — SIGNIFICANT CHANGE UP (ref 11.5–15.5)
IMM GRANULOCYTES NFR BLD AUTO: 0.5 % — SIGNIFICANT CHANGE UP (ref 0–1.5)
LDH SERPL L TO P-CCNC: 405 U/L — HIGH (ref 98–192)
LYMPHOCYTES # BLD AUTO: 0.9 K/UL — LOW (ref 1–3.3)
LYMPHOCYTES # BLD AUTO: 14.4 % — SIGNIFICANT CHANGE UP (ref 13–44)
MCHC RBC-ENTMCNC: 28.8 PG — SIGNIFICANT CHANGE UP (ref 27–34)
MCHC RBC-ENTMCNC: 33.3 GM/DL — SIGNIFICANT CHANGE UP (ref 32–36)
MCV RBC AUTO: 86.6 FL — SIGNIFICANT CHANGE UP (ref 80–100)
MONOCYTES # BLD AUTO: 0.5 K/UL — SIGNIFICANT CHANGE UP (ref 0–0.9)
MONOCYTES NFR BLD AUTO: 8 % — SIGNIFICANT CHANGE UP (ref 2–14)
NEUTROPHILS # BLD AUTO: 4.8 K/UL — SIGNIFICANT CHANGE UP (ref 1.8–7.4)
NEUTROPHILS NFR BLD AUTO: 76.9 % — SIGNIFICANT CHANGE UP (ref 43–77)
PLATELET # BLD AUTO: 209 K/UL — SIGNIFICANT CHANGE UP (ref 150–400)
POTASSIUM SERPL-MCNC: 5.2 MMOL/L — SIGNIFICANT CHANGE UP (ref 3.5–5.3)
POTASSIUM SERPL-SCNC: 5.2 MMOL/L — SIGNIFICANT CHANGE UP (ref 3.5–5.3)
PROCALCITONIN SERPL-MCNC: 0.15 NG/ML — HIGH (ref 0.02–0.1)
PROT SERPL-MCNC: 6.6 G/DL — SIGNIFICANT CHANGE UP (ref 6.6–8.7)
PROT SERPL-MCNC: 6.6 G/DL — SIGNIFICANT CHANGE UP (ref 6.6–8.7)
RBC # BLD: 4.69 M/UL — SIGNIFICANT CHANGE UP (ref 3.8–5.2)
RBC # FLD: 13.3 % — SIGNIFICANT CHANGE UP (ref 10.3–14.5)
SODIUM SERPL-SCNC: 132 MMOL/L — LOW (ref 135–145)
WBC # BLD: 6.24 K/UL — SIGNIFICANT CHANGE UP (ref 3.8–10.5)
WBC # FLD AUTO: 6.24 K/UL — SIGNIFICANT CHANGE UP (ref 3.8–10.5)

## 2021-01-10 PROCEDURE — 99232 SBSQ HOSP IP/OBS MODERATE 35: CPT

## 2021-01-10 RX ADMIN — Medication 81 MILLIGRAM(S): at 11:15

## 2021-01-10 RX ADMIN — Medication 6 MILLIGRAM(S): at 04:38

## 2021-01-10 RX ADMIN — ATORVASTATIN CALCIUM 80 MILLIGRAM(S): 80 TABLET, FILM COATED ORAL at 22:49

## 2021-01-10 RX ADMIN — Medication 250 MILLIGRAM(S): at 16:29

## 2021-01-10 RX ADMIN — Medication 250 MILLIGRAM(S): at 04:37

## 2021-01-10 RX ADMIN — ENOXAPARIN SODIUM 40 MILLIGRAM(S): 100 INJECTION SUBCUTANEOUS at 11:23

## 2021-01-10 RX ADMIN — REMDESIVIR 500 MILLIGRAM(S): 5 INJECTION INTRAVENOUS at 22:49

## 2021-01-10 RX ADMIN — TIOTROPIUM BROMIDE 1 CAPSULE(S): 18 CAPSULE ORAL; RESPIRATORY (INHALATION) at 09:23

## 2021-01-10 RX ADMIN — CLOPIDOGREL BISULFATE 75 MILLIGRAM(S): 75 TABLET, FILM COATED ORAL at 11:15

## 2021-01-10 RX ADMIN — Medication 1 TABLET(S): at 04:37

## 2021-01-11 ENCOUNTER — TRANSCRIPTION ENCOUNTER (OUTPATIENT)
Age: 66
End: 2021-01-11

## 2021-01-11 VITALS
SYSTOLIC BLOOD PRESSURE: 139 MMHG | DIASTOLIC BLOOD PRESSURE: 87 MMHG | OXYGEN SATURATION: 94 % | RESPIRATION RATE: 18 BRPM | HEART RATE: 88 BPM | TEMPERATURE: 98 F

## 2021-01-11 LAB
ALBUMIN SERPL ELPH-MCNC: 3.6 G/DL — SIGNIFICANT CHANGE UP (ref 3.3–5.2)
ALP SERPL-CCNC: 64 U/L — SIGNIFICANT CHANGE UP (ref 40–120)
ALT FLD-CCNC: 20 U/L — SIGNIFICANT CHANGE UP
AST SERPL-CCNC: 44 U/L — HIGH
BILIRUB DIRECT SERPL-MCNC: 0.1 MG/DL — SIGNIFICANT CHANGE UP (ref 0–0.3)
BILIRUB INDIRECT FLD-MCNC: 0.3 MG/DL — SIGNIFICANT CHANGE UP (ref 0.2–1)
BILIRUB SERPL-MCNC: 0.4 MG/DL — SIGNIFICANT CHANGE UP (ref 0.4–2)
CREAT SERPL-MCNC: 0.8 MG/DL — SIGNIFICANT CHANGE UP (ref 0.5–1.3)
GLUCOSE BLDC GLUCOMTR-MCNC: 103 MG/DL — HIGH (ref 70–99)
PROT SERPL-MCNC: 6.4 G/DL — LOW (ref 6.6–8.7)

## 2021-01-11 PROCEDURE — 86803 HEPATITIS C AB TEST: CPT

## 2021-01-11 PROCEDURE — 87086 URINE CULTURE/COLONY COUNT: CPT

## 2021-01-11 PROCEDURE — 99285 EMERGENCY DEPT VISIT HI MDM: CPT | Mod: 25

## 2021-01-11 PROCEDURE — 83036 HEMOGLOBIN GLYCOSYLATED A1C: CPT

## 2021-01-11 PROCEDURE — 84484 ASSAY OF TROPONIN QUANT: CPT

## 2021-01-11 PROCEDURE — 83615 LACTATE (LD) (LDH) ENZYME: CPT

## 2021-01-11 PROCEDURE — 80053 COMPREHEN METABOLIC PANEL: CPT

## 2021-01-11 PROCEDURE — 86769 SARS-COV-2 COVID-19 ANTIBODY: CPT

## 2021-01-11 PROCEDURE — 80076 HEPATIC FUNCTION PANEL: CPT

## 2021-01-11 PROCEDURE — 71045 X-RAY EXAM CHEST 1 VIEW: CPT

## 2021-01-11 PROCEDURE — 85379 FIBRIN DEGRADATION QUANT: CPT

## 2021-01-11 PROCEDURE — 82962 GLUCOSE BLOOD TEST: CPT

## 2021-01-11 PROCEDURE — 86140 C-REACTIVE PROTEIN: CPT

## 2021-01-11 PROCEDURE — 93005 ELECTROCARDIOGRAM TRACING: CPT

## 2021-01-11 PROCEDURE — 85025 COMPLETE CBC W/AUTO DIFF WBC: CPT

## 2021-01-11 PROCEDURE — 99232 SBSQ HOSP IP/OBS MODERATE 35: CPT

## 2021-01-11 PROCEDURE — 81001 URINALYSIS AUTO W/SCOPE: CPT

## 2021-01-11 PROCEDURE — 36415 COLL VENOUS BLD VENIPUNCTURE: CPT

## 2021-01-11 PROCEDURE — 84145 PROCALCITONIN (PCT): CPT

## 2021-01-11 PROCEDURE — 82565 ASSAY OF CREATININE: CPT

## 2021-01-11 PROCEDURE — 82728 ASSAY OF FERRITIN: CPT

## 2021-01-11 PROCEDURE — 80048 BASIC METABOLIC PNL TOTAL CA: CPT

## 2021-01-11 PROCEDURE — 96374 THER/PROPH/DIAG INJ IV PUSH: CPT

## 2021-01-11 PROCEDURE — 94640 AIRWAY INHALATION TREATMENT: CPT

## 2021-01-11 PROCEDURE — 0225U NFCT DS DNA&RNA 21 SARSCOV2: CPT

## 2021-01-11 PROCEDURE — 99239 HOSP IP/OBS DSCHRG MGMT >30: CPT

## 2021-01-11 RX ORDER — TIOTROPIUM BROMIDE 18 UG/1
1 CAPSULE ORAL; RESPIRATORY (INHALATION)
Qty: 0 | Refills: 0 | DISCHARGE
Start: 2021-01-11

## 2021-01-11 RX ORDER — ALBUTEROL 90 UG/1
2 AEROSOL, METERED ORAL
Qty: 0 | Refills: 0 | DISCHARGE
Start: 2021-01-11

## 2021-01-11 RX ADMIN — CLOPIDOGREL BISULFATE 75 MILLIGRAM(S): 75 TABLET, FILM COATED ORAL at 11:26

## 2021-01-11 RX ADMIN — ENOXAPARIN SODIUM 40 MILLIGRAM(S): 100 INJECTION SUBCUTANEOUS at 11:26

## 2021-01-11 RX ADMIN — Medication 81 MILLIGRAM(S): at 11:25

## 2021-01-11 RX ADMIN — Medication 250 MILLIGRAM(S): at 05:00

## 2021-01-11 RX ADMIN — Medication 250 MILLIGRAM(S): at 17:06

## 2021-01-11 RX ADMIN — Medication 6 MILLIGRAM(S): at 05:00

## 2021-01-11 RX ADMIN — TIOTROPIUM BROMIDE 1 CAPSULE(S): 18 CAPSULE ORAL; RESPIRATORY (INHALATION) at 09:20

## 2021-01-11 NOTE — PROGRESS NOTE ADULT - ASSESSMENT
65F who presented with a two day history of increasing dyspnea and non-productive cough. She also reported episodes of fevers, chills, malaise, and myalgias. The patient had been exposed to other family members who had tested positive for COVID-19. She had quarantined at home, and had previously been tested negative multiple times. She had taken ibuprofen at home with minimal relief of symptoms. She denied any similar symptoms in the past and noted that he dyspnea was worse with ambulation. In the emergency department, the patient was noted to have hypoxia with ambulation. She denied any associated chest pain, palpitations, orthopnea, or lower extremity edema. She did report mild nausea and poor appetite and poor oral intake over the past two days. Sh  COVID 19 POSITIVE HERE  PT IS REQUIRING SUPPLEMENTAL 02   ON  REMDESIVIR AND DECADRON  PRONE POSITIONING  CHECK INFALMAMMAOTRY MARKERS  WILL FOLLOWUP WITH FURTHER   RECOMMENDATIONS
65F who presented with a two day history of increasing dyspnea and non-productive cough. She also reported episodes of fevers, chills, malaise, and myalgias. The patient had been exposed to other family members who had tested positive for COVID-19. She had quarantined at home, and had previously been tested negative multiple times. She had taken ibuprofen at home with minimal relief of symptoms. She denied any similar symptoms in the past and noted that he dyspnea was worse with ambulation. In the emergency department, the patient was noted to have hypoxia with ambulation. She denied any associated chest pain, palpitations, orthopnea, or lower extremity edema. She did report mild nausea and poor appetite and poor oral intake over the past two days.      Acute Hypoxic respiratory failure  COVID-19 infection  B/L Pneumonia   cough  fevers  shortness of breath      - COVID 19 PCR positive   - CXR reporting no pneumonia   - Continue supportive care measures  - continue to trend inflammatory markers.   - trend CBC with diff, CMP,  CRP, Ferritin,  procalcitonin q 48hours  - Avoid antibiotics unless there is a concern for a bacterial infection  - May consider vitamin C, thiamine and zinc (note lack of evidence to support benefit with COVID 19)  - Discussed Remdesivir with the patient.   - Continue Remdesivir 200mg IV x1 followed by 100mg IV daily   - Monitor Cr and LFT's   - Dexamethasone 6mg Daily   - follow up all outstanding cultures  - Trend Fever  - Trend Leukocytosis      Will follow    
65y/oF PMH COPD, CAD, HTN, pre-diabetes presenting with fevers, chills, increasing dyspnea found to have hypoxia and +COVID-19 PCR     Acute hypoxic respiratory failure 2/2 COVID-19 PNA  -cont remdesivir  -cont dexamethasone   -ID recs appreciated   -Proning and IS encouraged   -wean supplemental O2 as tolerated     COPD  -no acute exacerbation  -cont inhalers     CAD  HTN, HLD  -cont asa, plavix, statin  -monitoring BP     UTI  -complete course empiric abx 1/10  -urine cx neg    ALKA  Hyponatremia   -likely 2/2 poor PO intake, viral infection  -improved s/p IVF     Pre-diabetes   -Hgba1c 5.8%  -cont ISS while on dexamethasone     dispo: likely home next 24hrs with home O2    pt declined family updates
65y/oF PMH COPD, CAD, HTN, pre-diabetes presenting with fevers, chills, increasing dyspnea found to have hypoxia and +COVID-19 PCR     Acute hypoxic respiratory failure 2/2 COVID-19 PNA  -cont remdesivir  -cont dexamethasone   -ID recs appreciated   -Proning and IS encouraged   -wean supplemental O2 as tolerated     COPD  -no acute exacerbation  -cont inhalers     CAD  HTN, HLD  -cont asa, plavix, statin  -monitoring BP     UTI  -complete course empiric abx, last day tomorrow 1/10  -urine cx neg    ALKA  Hyponatremia   -likely 2/2 poor PO intake, viral infection  -improved s/p IVF     Pre-diabetes   -Hgba1c 5.8%  -cont ISS while on dexamethasone     dispo: pending improvement, poss dc next 48-72hrs     pt declined family updates
65F who presented with a two day history of increasing dyspnea and non-productive cough. She also reported episodes of fevers, chills, malaise, and myalgias. The patient had been exposed to other family members who had tested positive for COVID-19. She had quarantined at home, and had previously been tested negative multiple times. She had taken ibuprofen at home with minimal relief of symptoms. She denied any similar symptoms in the past and noted that he dyspnea was worse with ambulation. In the emergency department, the patient was noted to have hypoxia with ambulation. She denied any associated chest pain, palpitations, orthopnea, or lower extremity edema. She did report mild nausea and poor appetite and poor oral intake over the past two days.      Acute Hypoxic respiratory failure  COVID-19 infection  B/L Pneumonia   cough  fevers  shortness of breath      - COVID 19 PCR positive   - CXR reporting no pneumonia   - Continue supportive care measures  -  PT CLINICALLY IMPROVED ON DECADRON REMDESIVIR   ON ROOM AIR STABLE FOR DISCHARGE  WILL FOLLOW UP  AS NEEDED

## 2021-01-11 NOTE — DISCHARGE NOTE NURSING/CASE MANAGEMENT/SOCIAL WORK - PATIENT PORTAL LINK FT
You can access the FollowMyHealth Patient Portal offered by [facility] by registering at the following website: [email or web address]. By joining [facility]’s FollowMyHealth portal, you will also be able to view your health information using other applications (apps) compatible with our [email or web address]

## 2021-01-11 NOTE — PROGRESS NOTE ADULT - SUBJECTIVE AND OBJECTIVE BOX
JERONIMO MORALES    [id number]    65y      Female    CC: SOB    INTERVAL HPI/OVERNIGHT EVENTS: pt seen and examined. no acute event o/n.     REVIEW OF SYSTEMS:    CONSTITUTIONAL: No fever, weight loss  RESPIRATORY: No wheezing, hemoptysis  CARDIOVASCULAR: No chest pain, palpitations  GASTROINTESTINAL: No abdominal or epigastric pain. No nausea, vomiting  NEUROLOGICAL: No headaches, memory loss    Vital Signs Last 24 Hrs  T(C): 36.9 (10 [place] 2021 09:56), Max: 36.9 (10 [place] 2021 09:56)  T(F): 98.4 (10 [place] 2021 09:56), Max: 98.4 (10 [place] 2021 09:56)  HR: 60 (10 [place] 2021 09:56) (60 - 76)  BP: 110/57 (10 [place] 2021 09:56) (102/64 - 110/57)  BP(mean): --  RR: 18 (10 [place] 2021 09:56) (18 - 18)  SpO2: 94% (10 [place] 2021 09:56) (92% - 96%)    PHYSICAL EXAM:    GENERAL: NAD  HEENT: PERRL, +EOMI  NECK: soft, supple  CHEST/LUNG: decreased breath sounds b/l  HEART: S1S2+, Regular rate and rhythm  ABDOMEN: Soft, Nontender, Nondistended; Bowel sounds present  SKIN: warm, dry  NEURO: AAOX3, no focal deficits  PSYCH: normal affect    LABS:                        13.5   6.24  )-----------( 209      ( 10 [place] 2021 08:59 )             40.6     01-10    132<L>  |  98  |  30.0<H>  ----------------------------<  89  5.2   |  22.0  |  0.93    Ca    8.9      10 [place] 2021 08:59    TPro  6.6  /  Alb  3.5  /  TBili  0.3<L>  /  DBili  0.1  /  AST  49<H>  /  ALT  21  /  AlkPhos  63  01-10      MEDICATIONS  (STANDING):  aspirin enteric coated 81 milliGRAM(s) Oral daily  atorvastatin 80 milliGRAM(s) Oral at bedtime  clopidogrel Tablet 75 milliGRAM(s) Oral daily  dexAMETHasone     Tablet 6 milliGRAM(s) Oral daily  dextrose 40% Gel 15 Gram(s) Oral once  dextrose 5%. 1000 milliLiter(s) (50 mL/Hr) IV Continuous <Continuous>  dextrose 5%. 1000 milliLiter(s) (100 mL/Hr) IV Continuous <Continuous>  dextrose 50% Injectable 25 Gram(s) IV Push once  dextrose 50% Injectable 12.5 Gram(s) IV Push once  dextrose 50% Injectable 25 Gram(s) IV Push once  enoxaparin Injectable 40 milliGRAM(s) SubCutaneous daily  glucagon  Injectable 1 milliGRAM(s) IntraMuscular once  insulin lispro (ADMELOG) corrective regimen sliding scale   SubCutaneous three times a day before meals  remdesivir  IVPB 100 milliGRAM(s) IV Intermittent every 24 hours  remdesivir  IVPB   IV Intermittent   saccharomyces boulardii 250 milliGRAM(s) Oral two times a day  tiotropium 18 MICROgram(s) Capsule 1 Capsule(s) Inhalation daily    MEDICATIONS  (PRN):  acetaminophen   Tablet .. 650 milliGRAM(s) Oral every 6 hours PRN Temp greater or equal to 38C (100.4F), Mild Pain (1 - 3)  ALBUTerol    90 MICROgram(s) HFA Inhaler 2 Puff(s) Inhalation every 6 hours PRN Shortness of Breath and/or Wheezing  ondansetron Injectable 4 milliGRAM(s) IV Push every 6 hours PRN Nausea and/or Vomiting      RADIOLOGY & ADDITIONAL TESTS:  
JERONIMO MORALES  ----------------------------------------  The patient was seen and evaluated for hypoxia. Reports dyspnea but denied chest pain or palpitations.    Vital Signs Last 24 Hrs  T(C): 37.4 (2021 05:59), Max: 39.5 (2021 22:51)  T(F): 99.4 (2021 05:59), Max: 103.1 (2021 22:51)  HR: 58 (2021 05:59) (58 - 118)  BP: 104/54 (2021 05:59) (104/54 - 148/65)  BP(mean): --  RR: 20 (2021 05:59) (18 - 24)  SpO2: 99% (2021 05:59) (89% - 99%)    CAPILLARY BLOOD GLUCOSE  POCT Blood Glucose.: 97 mg/dL (2021 08:36)  POCT Blood Glucose.: 100 mg/dL (2021 21:27)    PHYSICAL EXAMINATION:  ----------------------------------------  General appearance: No acute distress, Awake, Alert  HEENT: Normocephalic, Atraumatic, Conjunctiva clear, EOMI  Neck: Supple, No JVD, No tenderness  Lungs: Breath sound equal bilaterally, No wheezes, No rales  Cardiovascular: S1S2, Regular rhythm  Abdomen: Soft, Nontender, Nondistended, No guarding/rebound, Positive bowel sounds  Extremities: No clubbing, No cyanosis, No edema, No calf tenderness  Neuro: Strength equal bilaterally, No tremors  Psychiatric: Appropriate mood, Normal affect    LABORATORY STUDIES:  ----------------------------------------             13.8   3.99  )-----------( 150      ( 2021 09:24 )             41.4     01-08    x   |  x   |  x   ----------------------------<  x   x    |  x   |  0.89    Ca    8.4<L>      2021 06:25    TPro  6.5<L>  /  Alb  3.5  /  TBili  0.3<L>  /  DBili  0.1  /  AST  35<H>  /  ALT  17  /  AlkPhos  65  01-08    LIVER FUNCTIONS - ( 2021 06:25 )  Alb: 3.5 g/dL / Pro: 6.5 g/dL / ALK PHOS: 65 U/L / ALT: 17 U/L / AST: 35 U/L / GGT: x           CARDIAC MARKERS ( 2021 09:24 )  x     / <0.01 ng/mL / x     / x     / x        Urinalysis Basic - ( 2021 11:31 )  Color: Yellow / Appearance: Clear / S.015 / pH: x  Gluc: x / Ketone: Negative  / Bili: Negative / Urobili: Negative mg/dL   Blood: x / Protein: 30 mg/dL / Nitrite: Negative   Leuk Esterase: Moderate / RBC: 3-5 /HPF / WBC 11-25   Sq Epi: x / Non Sq Epi: Occasional / Bacteria: Occasional    MEDICATIONS  (STANDING):  aspirin enteric coated 81 milliGRAM(s) Oral daily  atorvastatin 80 milliGRAM(s) Oral at bedtime  clopidogrel Tablet 75 milliGRAM(s) Oral daily  dexAMETHasone     Tablet 6 milliGRAM(s) Oral daily  dextrose 40% Gel 15 Gram(s) Oral once  dextrose 5%. 1000 milliLiter(s) (50 mL/Hr) IV Continuous <Continuous>  dextrose 5%. 1000 milliLiter(s) (100 mL/Hr) IV Continuous <Continuous>  dextrose 50% Injectable 25 Gram(s) IV Push once  dextrose 50% Injectable 12.5 Gram(s) IV Push once  dextrose 50% Injectable 25 Gram(s) IV Push once  enoxaparin Injectable 40 milliGRAM(s) SubCutaneous daily  glucagon  Injectable 1 milliGRAM(s) IntraMuscular once  insulin lispro (ADMELOG) corrective regimen sliding scale   SubCutaneous three times a day before meals  remdesivir  IVPB 100 milliGRAM(s) IV Intermittent every 24 hours  remdesivir  IVPB   IV Intermittent   saccharomyces boulardii 250 milliGRAM(s) Oral two times a day  tiotropium 18 MICROgram(s) Capsule 1 Capsule(s) Inhalation daily  trimethoprim  160 mG/sulfamethoxazole 800 mG 1 Tablet(s) Oral two times a day    MEDICATIONS  (PRN):  acetaminophen   Tablet .. 650 milliGRAM(s) Oral every 6 hours PRN Temp greater or equal to 38C (100.4F), Mild Pain (1 - 3)  ALBUTerol    90 MICROgram(s) HFA Inhaler 2 Puff(s) Inhalation every 6 hours PRN Shortness of Breath and/or Wheezing  ondansetron Injectable 4 milliGRAM(s) IV Push every 6 hours PRN Nausea and/or Vomiting      ASSESSMENT / PLAN:  ----------------------------------------  65F with a history of COPD, coronary artery disease, hypertension, and pre-diabetes who presented with fevers, chills, and increasing dyspnea found to have hypoxia with positive COVID-19 PCR.    COVID-19 pneumonia / Acute hypoxic respiratory failure - Titration of supplemental oxygen as tolerated. Discussed with Infectious Disease. On remdesivir and dexamethasone. Enoxaparin for venous thromboembolism prophylaxis.    COPD - No wheezing or evidence of bronchospasm on examination. To continue on inhaled bronchodilator therapy as needed.    Coronary artery disease - No chest pain. On aspirin, clopidogrel, and atorvastatin.    Hypertension - Close blood pressure monitoring.    Urinary tract infection - On empiric antibiotics. Urine culture results to be reviewed when available.    Acute kidney injury / Hyponatremia - Suspect secondary to poor oral intake. Resolved with intravenous fluids.    Pre-diabetes - The patient was noted to be on empagliflozin at home. For insulin coverage as needed as the patient will be on dexamethasone. Hba1c 5.8.  
[facility]  INFECTIOUS DISEASES AND INTERNAL MEDICINE at [place]  =======================================================  Parmjit Nazario MD  Diplomates American Board of Internal Medicine and Infectious Diseases  Tel: [phone]      Fax: [phone]  =======================================================    JERONIMO MORALES [id number]    Follow up: COVID 19     No fever or chills  Still on O2, on NC      Allergies:  Ceclor (Swelling)  codeine (Vomiting)  penicillin (Unknown)       REVIEW OF SYSTEMS:  CONSTITUTIONAL:  No Fever or chills  HEENT:   No diplopia or blurred vision.  No earache, sore throat or runny nose.  CARDIOVASCULAR:  No pressure, squeezing, strangling, tightness, heaviness or aching about the chest, neck, axilla or epigastrium.  RESPIRATORY:  + cough, + shortness of breath  GASTROINTESTINAL:  No nausea, vomiting or diarrhea.  GENITOURINARY:  No dysuria, frequency or urgency. No Blood in urine  MUSCULOSKELETAL:  no joint aches, no muscle pain  SKIN:  No change in skin, hair or nails.  NEUROLOGIC:  No Headaches, seizures or weakness.  PSYCHIATRIC:  No disorder of thought or mood.  ENDOCRINE:  No heat or cold intolerance  HEMATOLOGICAL:  No easy bruising or bleeding.       Physical Exam:  GEN: NAD, pleasant  HEENT: normocephalic and atraumatic. EOMI. PERRL.  Anicteric   NECK: Supple.   LUNGS: Course BS B/L  HEART: Regular rate and rhythm  ABDOMEN: Soft, nontender, and nondistended.  Positive bowel sounds.    : No CVA tenderness  EXTREMITIES: Without any edema.  MSK: no joint swelling  NEUROLOGIC:  No focal deficits  PSYCHIATRIC: Appropriate affect .  SKIN: No Rash      Vitals:   Vital Signs Last 24 Hrs  T(C): 36.7 (11 Jan 2021 10:15), Max: 36.8 (10 Wayne 2021 23:00)  T(F): 98 (11 Jan 2021 10:15), Max: 98.3 (10 Wayne 2021 23:00)  HR: 71 (11 Jan 2021 10:15) (61 - 71)  BP: 106/69 (11 Jan 2021 10:15) (106/69 - 125/72)  BP(mean): --  RR: 18 (11 Jan 2021 11:00) (18 - 18)  SpO2: 93% (11 Jan 2021 11:00) (89% - 96%)        Current Antibiotics:  remdesivir  IVPB 100 milliGRAM(s) IV Intermittent every 24 hours  remdesivir  IVPB   IV Intermittent   trimethoprim  160 mG/sulfamethoxazole 800 mG 1 Tablet(s) Oral two times a day      Other medications:  aspirin enteric coated 81 milliGRAM(s) Oral daily  atorvastatin 80 milliGRAM(s) Oral at bedtime  clopidogrel Tablet 75 milliGRAM(s) Oral daily  dexAMETHasone     Tablet 6 milliGRAM(s) Oral daily  dextrose 40% Gel 15 Gram(s) Oral once  dextrose 5%. 1000 milliLiter(s) IV Continuous <Continuous>  dextrose 5%. 1000 milliLiter(s) IV Continuous <Continuous>  dextrose 50% Injectable 25 Gram(s) IV Push once  dextrose 50% Injectable 12.5 Gram(s) IV Push once  dextrose 50% Injectable 25 Gram(s) IV Push once  enoxaparin Injectable 40 milliGRAM(s) SubCutaneous daily  glucagon  Injectable 1 milliGRAM(s) IntraMuscular once  insulin lispro (ADMELOG) corrective regimen sliding scale   SubCutaneous three times a day before meals  saccharomyces boulardii 250 milliGRAM(s) Oral two times a day  tiotropium 18 MICROgram(s) Capsule 1 Capsule(s) Inhalation daily      Labs:                            13.5   6.24  )-----------( 209      ( 10 Wayne 2021 08:59 )             40.6   01-11    x   |  x   |  x   ----------------------------<  x   x    |  x   |  0.80    Ca    8.9      10 Wayne 2021 08:59    TPro  6.4<L>  /  Alb  3.6  /  TBili  0.4  /  DBili  0.1  /  AST  44<H>  /  ALT  20  /  AlkPhos  64  01-11        Culture - Urine (collected 01-07-21 @ 15:02)  Source: .Urine Clean Catch (Midstream)  Final Report (01-08-21 @ 13:38):    <10,000 CFU/mL Normal Urogenital Trinidad    WBC Count: 3.99 K/uL (01-07-21 @ 09:24)    Creatinine, Serum: 0.89 mg/dL (01-08-21 @ 06:26)  Creatinine, Serum: 0.91 mg/dL (01-08-21 @ 06:25)  Creatinine, Serum: 1.15 mg/dL (01-07-21 @ 13:31)  Creatinine, Serum: 1.42 mg/dL (01-07-21 @ 09:24)    C-Reactive Protein, Serum: 9.11 mg/dL (01-07-21 @ 09:24)    Ferritin, Serum: 546 ng/mL (01-07-21 @ 09:24)    Procalcitonin, Serum: 0.16 ng/mL (01-07-21 @ 09:24)     Rapid RVP Result: Detected (01-07-21 @ 09:45)  SARS-CoV-2: Detected (01-07-21 @ 09:45)    COVID-19 IgG Antibody Interpretation: Negative (01-08-21 @ 12:00)  COVID-19 IgG Antibody Index: 0.07 Index (01-08-21 @ 12:00)      < from: Xray Chest 1 View- PORTABLE-Urgent (01.07.21 @ 10:28) >  EXAM:  XR CHEST PORTABLE URGENT 1V                          PROCEDURE DATE:  01/07/2021          INTERPRETATION:  TECHNIQUE: Single portable view of the chest.    COMPARISON: CT chest dated 10/8/2020    CLINICAL HISTORY: Shortness of Breath, Cough,  Fever    FINDINGS:    Single frontal view of the chest demonstrates 5 mm left lower lobe pulmonary granuloma. No pneumonia. The cardiomediastinal silhouette is normal. No acute osseous abnormalities.    IMPRESSION: No pneumonia.    < end of copied text >      
[facility]  INFECTIOUS DISEASES AND INTERNAL MEDICINE at [place]  =======================================================  Parmjit Nazario MD  Diplomates American Board of Internal Medicine and Infectious Diseases  Tel: [phone]      Fax: [phone]  =======================================================    JERONIMO MORALES [id number]    Follow up: COVID 19     No fever or chills  Still on O2, on NC      Allergies:  Ceclor (Swelling)  codeine (Vomiting)  penicillin (Unknown)       REVIEW OF SYSTEMS:  CONSTITUTIONAL:  No Fever or chills  HEENT:   No diplopia or blurred vision.  No earache, sore throat or runny nose.  CARDIOVASCULAR:  No pressure, squeezing, strangling, tightness, heaviness or aching about the chest, neck, axilla or epigastrium.  RESPIRATORY:  + cough, + shortness of breath  GASTROINTESTINAL:  No nausea, vomiting or diarrhea.  GENITOURINARY:  No dysuria, frequency or urgency. No Blood in urine  MUSCULOSKELETAL:  no joint aches, no muscle pain  SKIN:  No change in skin, hair or nails.  NEUROLOGIC:  No Headaches, seizures or weakness.  PSYCHIATRIC:  No disorder of thought or mood.  ENDOCRINE:  No heat or cold intolerance  HEMATOLOGICAL:  No easy bruising or bleeding.       Physical Exam:  GEN: NAD, pleasant  HEENT: normocephalic and atraumatic. EOMI. PERRL.  Anicteric   NECK: Supple.   LUNGS: Course BS B/L  HEART: Regular rate and rhythm  ABDOMEN: Soft, nontender, and nondistended.  Positive bowel sounds.    : No CVA tenderness  EXTREMITIES: Without any edema.  MSK: no joint swelling  NEUROLOGIC:  No focal deficits  PSYCHIATRIC: Appropriate affect .  SKIN: No Rash      Vitals:  T(F): 97.8 (09 Jan 2021 06:34), Max: 99.3 (08 Jan 2021 18:28)  HR: 63 (09 Jan 2021 06:34)  BP: 104/62 (09 Jan 2021 06:34)  RR: 18 (09 Jan 2021 06:34)  SpO2: 95% (09 Jan 2021 06:34) (93% - 98%)  temp max in last 48H T(F): , Max: 103.1 (01-07-21 @ 22:51)      Current Antibiotics:  remdesivir  IVPB 100 milliGRAM(s) IV Intermittent every 24 hours  remdesivir  IVPB   IV Intermittent   trimethoprim  160 mG/sulfamethoxazole 800 mG 1 Tablet(s) Oral two times a day      Other medications:  aspirin enteric coated 81 milliGRAM(s) Oral daily  atorvastatin 80 milliGRAM(s) Oral at bedtime  clopidogrel Tablet 75 milliGRAM(s) Oral daily  dexAMETHasone     Tablet 6 milliGRAM(s) Oral daily  dextrose 40% Gel 15 Gram(s) Oral once  dextrose 5%. 1000 milliLiter(s) IV Continuous <Continuous>  dextrose 5%. 1000 milliLiter(s) IV Continuous <Continuous>  dextrose 50% Injectable 25 Gram(s) IV Push once  dextrose 50% Injectable 12.5 Gram(s) IV Push once  dextrose 50% Injectable 25 Gram(s) IV Push once  enoxaparin Injectable 40 milliGRAM(s) SubCutaneous daily  glucagon  Injectable 1 milliGRAM(s) IntraMuscular once  insulin lispro (ADMELOG) corrective regimen sliding scale   SubCutaneous three times a day before meals  saccharomyces boulardii 250 milliGRAM(s) Oral two times a day  tiotropium 18 MICROgram(s) Capsule 1 Capsule(s) Inhalation daily      Labs:             13.8   3.99  )-----------( 150      ( 07 Jan 2021 09:24 )             41.4      01-08    x   |  x   |  x   ----------------------------<  x   x    |  x   |  0.89    Ca    8.4<L>      08 Jan 2021 06:25    TPro  6.5<L>  /  Alb  3.5  /  TBili  0.3<L>  /  DBili  0.1  /  AST  35<H>  /  ALT  17  /  AlkPhos  65  01-08      Culture - Urine (collected 01-07-21 @ 15:02)  Source: .Urine Clean Catch (Midstream)  Final Report (01-08-21 @ 13:38):    <10,000 CFU/mL Normal Urogenital Trinidad    WBC Count: 3.99 K/uL (01-07-21 @ 09:24)    Creatinine, Serum: 0.89 mg/dL (01-08-21 @ 06:26)  Creatinine, Serum: 0.91 mg/dL (01-08-21 @ 06:25)  Creatinine, Serum: 1.15 mg/dL (01-07-21 @ 13:31)  Creatinine, Serum: 1.42 mg/dL (01-07-21 @ 09:24)    C-Reactive Protein, Serum: 9.11 mg/dL (01-07-21 @ 09:24)    Ferritin, Serum: 546 ng/mL (01-07-21 @ 09:24)    Procalcitonin, Serum: 0.16 ng/mL (01-07-21 @ 09:24)     Rapid RVP Result: Detected (01-07-21 @ 09:45)  SARS-CoV-2: Detected (01-07-21 @ 09:45)    COVID-19 IgG Antibody Interpretation: Negative (01-08-21 @ 12:00)  COVID-19 IgG Antibody Index: 0.07 Index (01-08-21 @ 12:00)      < from: Xray Chest 1 View- PORTABLE-Urgent (01.07.21 @ 10:28) >  EXAM:  XR CHEST PORTABLE URGENT 1V                          PROCEDURE DATE:  01/07/2021          INTERPRETATION:  TECHNIQUE: Single portable view of the chest.    COMPARISON: CT chest dated 10/8/2020    CLINICAL HISTORY: Shortness of Breath, Cough,  Fever    FINDINGS:    Single frontal view of the chest demonstrates 5 mm left lower lobe pulmonary granuloma. No pneumonia. The cardiomediastinal silhouette is normal. No acute osseous abnormalities.    IMPRESSION: No pneumonia.    < end of copied text >      
INFECTIOUS DISEASES AND INTERNAL MEDICINE at [place]  =======================================================  Parmjit Nazario MD  Diplomates American Board of Internal Medicine and Infectious Diseases  Telephone [phone]  Fax            [phone]  =======================================================    CARMEN JERONIMO [id number]    Follow up: COVID  19    Allergies:  Ceclor (Swelling)  codeine (Vomiting)  penicillin (Unknown)      Medications:  acetaminophen   Tablet .. 650 milliGRAM(s) Oral every 6 hours PRN  ALBUTerol    90 MICROgram(s) HFA Inhaler 2 Puff(s) Inhalation every 6 hours PRN  aspirin enteric coated 81 milliGRAM(s) Oral daily  atorvastatin 80 milliGRAM(s) Oral at bedtime  clopidogrel Tablet 75 milliGRAM(s) Oral daily  dexAMETHasone     Tablet 6 milliGRAM(s) Oral daily  dextrose 40% Gel 15 Gram(s) Oral once  dextrose 5%. 1000 milliLiter(s) IV Continuous <Continuous>  dextrose 5%. 1000 milliLiter(s) IV Continuous <Continuous>  dextrose 50% Injectable 25 Gram(s) IV Push once  dextrose 50% Injectable 12.5 Gram(s) IV Push once  dextrose 50% Injectable 25 Gram(s) IV Push once  enoxaparin Injectable 40 milliGRAM(s) SubCutaneous daily  glucagon  Injectable 1 milliGRAM(s) IntraMuscular once  insulin lispro (ADMELOG) corrective regimen sliding scale   SubCutaneous three times a day before meals  ondansetron Injectable 4 milliGRAM(s) IV Push every 6 hours PRN  remdesivir  IVPB 100 milliGRAM(s) IV Intermittent every 24 hours  remdesivir  IVPB   IV Intermittent   saccharomyces boulardii 250 milliGRAM(s) Oral two times a day  tiotropium 18 MICROgram(s) Capsule 1 Capsule(s) Inhalation daily  trimethoprim  160 mG/sulfamethoxazole 800 mG 1 Tablet(s) Oral two times a day    SOCIAL       FAMILY   FAMILY HISTORY:    REVIEW OF SYSTEMS:  CONSTITUTIONAL:  No Fever or chills  HEENT:   No diplopia or blurred vision.  No earache, sore throat or runny nose.  CARDIOVASCULAR:  No pressure, squeezing, strangling, tightness, heaviness or aching about the chest, neck, axilla or epigastrium.  RESPIRATORY:   cough, shortness of breath,    GASTROINTESTINAL:  No nausea, vomiting or diarrhea.  GENITOURINARY:  No dysuria, frequency or urgency. No Blood in urine  MUSCULOSKELETAL:   moves all joints  SKIN:  No change in skin, hair or nails.  NEUROLOGIC:  No paresthesias, fasciculations, seizures or weakness.  PSYCHIATRIC:  No disorder of thought or mood.  ENDOCRINE:  No heat or cold intolerance, polyuria or polydipsia.  HEMATOLOGICAL:  No easy bruising or bleeding.            Physical Exam:  I Vital Signs Last 24 Hrs  T(C): 37.4 (08 Jan 2021 05:59), Max: 39.5 (07 Jan 2021 22:51)  T(F): 99.4 (08 Jan 2021 05:59), Max: 103.1 (07 Jan 2021 22:51)  HR: 58 (08 Jan 2021 05:59) (58 - 118)  BP: 104/54 (08 Jan 2021 05:59) (104/54 - 148/65)  BP(mean): --  RR: 20 (08 Jan 2021 05:59) (18 - 24)  SpO2: 99% (08 Jan 2021 05:59) (89% - 99%)    GEN: NAD,   HEENT: normocephalic and atraumatic. EOMI. CALIXTO.    NECK: Supple. No carotid bruits.  No lymphadenopathy or thyromegaly.  LUNGS: Clear to auscultation.  HEART: Regular rate and rhythm without murmur.  ABDOMEN: Soft, nontender, and nondistended.  Positive bowel sounds.    : No CVA tenderness  EXTREMITIES: Without any cyanosis, clubbing, rash, lesions or edema.  MSK: no joint swelling  NEUROLOGIC: Cranial nerves II through XII are grossly intact.  PSYCHIATRIC: Appropriate affect .  SKIN: No ulceration or induration present.        Labs:  Vitals:  ============  T(F): 99.4 (08 Jan 2021 05:59), Max: 103.1 (07 Jan 2021 22:51)  HR: 58 (08 Jan 2021 05:59)  BP: 104/54 (08 Jan 2021 05:59)  RR: 20 (08 Jan 2021 05:59)  SpO2: 99% (08 Jan 2021 05:59) (89% - 99%)  temp max in last 48H T(F): , Max: 103.1 (01-07-21 @ 22:51)    =======================================================  Current Antibiotics:  remdesivir  IVPB 100 milliGRAM(s) IV Intermittent every 24 hours  remdesivir  IVPB   IV Intermittent   trimethoprim  160 mG/sulfamethoxazole 800 mG 1 Tablet(s) Oral two times a day    Other medications:  aspirin enteric coated 81 milliGRAM(s) Oral daily  atorvastatin 80 milliGRAM(s) Oral at bedtime  clopidogrel Tablet 75 milliGRAM(s) Oral daily  dexAMETHasone     Tablet 6 milliGRAM(s) Oral daily  dextrose 40% Gel 15 Gram(s) Oral once  dextrose 5%. 1000 milliLiter(s) IV Continuous <Continuous>  dextrose 5%. 1000 milliLiter(s) IV Continuous <Continuous>  dextrose 50% Injectable 25 Gram(s) IV Push once  dextrose 50% Injectable 12.5 Gram(s) IV Push once  dextrose 50% Injectable 25 Gram(s) IV Push once  enoxaparin Injectable 40 milliGRAM(s) SubCutaneous daily  glucagon  Injectable 1 milliGRAM(s) IntraMuscular once  insulin lispro (ADMELOG) corrective regimen sliding scale   SubCutaneous three times a day before meals  saccharomyces boulardii 250 milliGRAM(s) Oral two times a day  tiotropium 18 MICROgram(s) Capsule 1 Capsule(s) Inhalation daily      =======================================================  Labs:                        13.8   3.99  )-----------( 150      ( 07 Jan 2021 09:24 )             41.4      01-08    x   |  x   |  x   ----------------------------<  x   x    |  x   |  0.89    Ca    8.4<L>      08 Jan 2021 06:25    TPro  6.5<L>  /  Alb  3.5  /  TBili  0.3<L>  /  DBili  0.1  /  AST  35<H>  /  ALT  17  /  AlkPhos  65  01-08      Creatinine, Serum: 0.89 mg/dL (01-08-21 @ 06:26)  Creatinine, Serum: 0.91 mg/dL (01-08-21 @ 06:25)  Creatinine, Serum: 1.15 mg/dL (01-07-21 @ 13:31)  Creatinine, Serum: 1.42 mg/dL (01-07-21 @ 09:24)    Procalcitonin, Serum: 0.16 ng/mL (01-07-21 @ 09:24)      Ferritin, Serum: 546 ng/mL (01-07-21 @ 09:24)    C-Reactive Protein, Serum: 9.11 mg/dL (01-07-21 @ 09:24)    WBC Count: 3.99 K/uL (01-07-21 @ 09:24)    Rapid RVP Result: Detected (01-07-21 @ 09:45)  SARS-CoV-2: Detected (01-07-21 @ 09:45)        Alkaline Phosphatase, Serum: 65 U/L (01-08-21 @ 06:25)  Alkaline Phosphatase, Serum: 66 U/L (01-07-21 @ 13:31)  Alkaline Phosphatase, Serum: 86 U/L (01-07-21 @ 09:24)  Alanine Aminotransferase (ALT/SGPT): 17 U/L (01-08-21 @ 06:25)  Alanine Aminotransferase (ALT/SGPT): 16 U/L (01-07-21 @ 13:31)  Alanine Aminotransferase (ALT/SGPT): 21 U/L (01-07-21 @ 09:24)  Aspartate Aminotransferase (AST/SGOT): 35 U/L (01-08-21 @ 06:25)  Aspartate Aminotransferase (AST/SGOT): 28 U/L (01-07-21 @ 13:31)  Aspartate Aminotransferase (AST/SGOT): 38 U/L (01-07-21 @ 09:24)  Bilirubin Total, Serum: 0.3 mg/dL (01-08-21 @ 06:25)  Bilirubin Total, Serum: 0.2 mg/dL (01-07-21 @ 13:31)  Bilirubin Total, Serum: 0.3 mg/dL (01-07-21 @ 09:24)  Bilirubin Direct, Serum: 0.1 mg/dL (01-08-21 @ 06:25)  Bilirubin Direct, Serum: 0.1 mg/dL (01-07-21 @ 13:31)  
JERONIMO MORALES    [id number]    65y      Female    CC: SOB    INTERVAL HPI/OVERNIGHT EVENTS: Pt seen and examined. No acute events o/n. Reports feeling better than yesterday. Denies CP, abd pain, n/v/c/d. Seen on 2L NC    REVIEW OF SYSTEMS:    CONSTITUTIONAL: No fever, weight loss  RESPIRATORY: No cough, wheezing, hemoptysis  CARDIOVASCULAR: No chest pain, palpitations  GASTROINTESTINAL: No abdominal or epigastric pain. No nausea, vomiting  NEUROLOGICAL: No headaches, memory loss    Vital Signs Last 24 Hrs  T(C): 36.6 (09 Jan 2021 09:10), Max: 37.4 (08 Jan 2021 18:28)  T(F): 97.9 (09 Jan 2021 09:10), Max: 99.3 (08 Jan 2021 18:28)  HR: 63 (09 Jan 2021 09:10) (63 - 78)  BP: 102/57 (09 Jan 2021 09:10) (100/59 - 104/62)  BP(mean): --  RR: 18 (09 Jan 2021 09:10) (18 - 18)  SpO2: 92% (09 Jan 2021 09:10) (92% - 95%)    PHYSICAL EXAM:    GENERAL: NAD  HEENT: PERRL, +EOMI  NECK: soft, supple  CHEST/LUNG: decreased breath sounds b/l bases, respirations unlabored on 2L NC  HEART: S1S2+, Regular rate and rhythm  ABDOMEN: Soft, Nontender, Nondistended; Bowel sounds present  SKIN: warm, dry  NEURO: AAOX3, no focal deficits  PSYCH: normal affect    LABS:    01-09    x   |  x   |  x   ----------------------------<  x   x    |  x   |  0.93    Ca    8.4<L>      08 Jan 2021 06:25    TPro  6.7  /  Alb  3.8  /  TBili  0.3<L>  /  DBili  0.1  /  AST  42<H>  /  ALT  19  /  AlkPhos  67  01-09            MEDICATIONS  (STANDING):  aspirin enteric coated 81 milliGRAM(s) Oral daily  atorvastatin 80 milliGRAM(s) Oral at bedtime  clopidogrel Tablet 75 milliGRAM(s) Oral daily  dexAMETHasone     Tablet 6 milliGRAM(s) Oral daily  dextrose 40% Gel 15 Gram(s) Oral once  dextrose 5%. 1000 milliLiter(s) (50 mL/Hr) IV Continuous <Continuous>  dextrose 5%. 1000 milliLiter(s) (100 mL/Hr) IV Continuous <Continuous>  dextrose 50% Injectable 25 Gram(s) IV Push once  dextrose 50% Injectable 12.5 Gram(s) IV Push once  dextrose 50% Injectable 25 Gram(s) IV Push once  enoxaparin Injectable 40 milliGRAM(s) SubCutaneous daily  glucagon  Injectable 1 milliGRAM(s) IntraMuscular once  insulin lispro (ADMELOG) corrective regimen sliding scale   SubCutaneous three times a day before meals  remdesivir  IVPB 100 milliGRAM(s) IV Intermittent every 24 hours  remdesivir  IVPB   IV Intermittent   saccharomyces boulardii 250 milliGRAM(s) Oral two times a day  tiotropium 18 MICROgram(s) Capsule 1 Capsule(s) Inhalation daily  trimethoprim  160 mG/sulfamethoxazole 800 mG 1 Tablet(s) Oral two times a day    MEDICATIONS  (PRN):  acetaminophen   Tablet .. 650 milliGRAM(s) Oral every 6 hours PRN Temp greater or equal to 38C (100.4F), Mild Pain (1 - 3)  ALBUTerol    90 MICROgram(s) HFA Inhaler 2 Puff(s) Inhalation every 6 hours PRN Shortness of Breath and/or Wheezing  ondansetron Injectable 4 milliGRAM(s) IV Push every 6 hours PRN Nausea and/or Vomiting      RADIOLOGY & ADDITIONAL TESTS:

## 2021-01-11 NOTE — DISCHARGE NOTE PROVIDER - NSDCMRMEDTOKEN_GEN_ALL_CORE_FT
albuterol 90 mcg/inh inhalation aerosol: 2 puff(s) inhaled every 6 hours, As needed, Shortness of Breath and/or Wheezing  aspirin 81 mg oral tablet, chewable: 1 tab(s) orally once a day  atorvastatin 40 mg oral tablet: 1 tab(s) orally once a day  clopidogrel 75 mg oral tablet: 1 tab(s) orally once a day  tiotropium 18 mcg inhalation capsule: 1 cap(s) inhaled once a day  Vitamin D3 1000 intl units oral capsule: 1 cap(s) orally once a day   albuterol 90 mcg/inh inhalation aerosol: 2 puff(s) inhaled every 6 hours, As needed, Shortness of Breath and/or Wheezing  aspirin 81 mg oral tablet, chewable: 1 tab(s) orally once a day  atorvastatin 40 mg oral tablet: 1 tab(s) orally once a day  clopidogrel 75 mg oral tablet: 1 tab(s) orally once a day  dexamethasone 6 mg oral tablet: 1 tab(s) orally once a day  tiotropium 18 mcg inhalation capsule: 1 cap(s) inhaled once a day  Vitamin D3 1000 intl units oral capsule: 1 cap(s) orally once a day

## 2021-01-11 NOTE — DISCHARGE NOTE PROVIDER - CARE PROVIDER_API CALL
Maury Mcgee)  Internal Medicine; Pulmonary Disease  Pulmonary Medicine at [place], [address]  [place] [address]  Phone: [phone]  Fax: [phone]  Follow Up Time:     Parmjit Carty  INFECTIOUS DISEASE  [address], [address]  [place] [address]  Phone: [phone]  Fax: [phone]  Follow Up Time:

## 2021-01-11 NOTE — DISCHARGE NOTE NURSING/CASE MANAGEMENT/SOCIAL WORK - NSDCFUADDAPPT_GEN_ALL_CORE_FT
Your BP medications were held during your hospitalization. Please follow up with your PMD regarding restarting medication

## 2021-01-11 NOTE — DISCHARGE NOTE PROVIDER - NSDCFUADDINST_GEN_ALL_CORE_FT
It has been determined that you no longer need hospitalization and can recover while remaining in self-quarantine at home for 14 days. Please remain in quarantine until then. You should restrict activities outside your home except for getting medical care.  Do not go to work, school or public areas.  Avoid using public transportation.  Separate yourself from other people and animals in your home.  Cover your coughs and sneezes.  Clean your hands often. Avoid sharing personal household items. Clean all high touch surfaces. Monitor your symptoms, if you have a medical emergency call 911.

## 2021-01-11 NOTE — DISCHARGE NOTE PROVIDER - NSDCCPCAREPLAN_GEN_ALL_CORE_FT
PRINCIPAL DISCHARGE DIAGNOSIS  Diagnosis: Acute hypoxemic respiratory failure due to COVID-19  Assessment and Plan of Treatment:       SECONDARY DISCHARGE DIAGNOSES  Diagnosis: UTI (urinary tract infection)  Assessment and Plan of Treatment:

## 2021-01-11 NOTE — DISCHARGE NOTE PROVIDER - CARE PROVIDERS DIRECT ADDRESSES
[email or web address]

## 2021-01-11 NOTE — DISCHARGE NOTE PROVIDER - HOSPITAL COURSE
65y/oF PMH COPD, CAD, HTN, pre-diabetes presenting with fevers, chills, increasing dyspnea found to have hypoxia and +COVID-19 PCR     Acute hypoxic respiratory failure 2/2 COVID-19 PNA  -treated with remdesivir, decadron   -ID recs appreciated   -Proning and IS encouraged   -Pt now stable on room air     COPD  -no acute exacerbation  -cont inhalers     CAD  HTN, HLD  -cont asa, plavix, statin  -monitoring BP off medications    UTI  -complete course empiric abx 1/10  -urine cx neg    ALKA  Hyponatremia   -likely 2/2 poor PO intake, viral infection  -improved s/p IVF     Pre-diabetes   -Hgba1c 5.8%  -cont ISS while on dexamethasone     Vital Signs Last 24 Hrs  T(C): 36.7 (11 Jan 2021 10:15), Max: 36.8 (10 Wayne 2021 23:00)  T(F): 98 (11 Jan 2021 10:15), Max: 98.3 (10 Wayne 2021 23:00)  HR: 71 (11 Jan 2021 10:15) (61 - 71)  BP: 106/69 (11 Jan 2021 10:15) (106/69 - 125/72)  BP(mean): --  RR: 18 (11 Jan 2021 10:15) (18 - 18)  SpO2: 89% (11 Jan 2021 10:15) (89% - 96%)    GENERAL: NAD  HEENT: PERRL, +EOMI  NECK: soft, supple  CHEST/LUNG: decreased breath sounds b/l  HEART: S1S2+, Regular rate and rhythm  ABDOMEN: Soft, Nontender, Nondistended; Bowel sounds present  SKIN: warm, dry  NEURO: AAOX3, no focal deficits  PSYCH: normal affect    35minutes spent on discharge 65y/oF PMH COPD, CAD, HTN, pre-diabetes presenting with fevers, chills, increasing dyspnea found to have hypoxia and +COVID-19 PCR     Acute hypoxic respiratory failure 2/2 COVID-19 PNA  -treated with remdesivir, decadron   -pt to dc home on room air with decardon to complete 10 day course  -ID recs appreciated   -Proning and IS encouraged   -Pt now stable on room air     COPD  -no acute exacerbation  -cont inhalers     CAD  HTN, HLD  -cont asa, plavix, statin  -monitoring BP off medications    UTI  -complete course empiric abx 1/10  -urine cx neg    ALKA  Hyponatremia   -likely 2/2 poor PO intake, viral infection  -improved s/p IVF     Pre-diabetes   -Hgba1c 5.8%  -cont ISS while on dexamethasone     Vital Signs Last 24 Hrs  T(C): 36.7 (11 Jan 2021 10:15), Max: 36.8 (10 Wayne 2021 23:00)  T(F): 98 (11 Jan 2021 10:15), Max: 98.3 (10 Wayne 2021 23:00)  HR: 71 (11 Jan 2021 10:15) (61 - 71)  BP: 106/69 (11 Jan 2021 10:15) (106/69 - 125/72)  BP(mean): --  RR: 18 (11 Jan 2021 10:15) (18 - 18)  SpO2: 89% (11 Jan 2021 10:15) (89% - 96%)    GENERAL: NAD  HEENT: PERRL, +EOMI  NECK: soft, supple  CHEST/LUNG: decreased breath sounds b/l  HEART: S1S2+, Regular rate and rhythm  ABDOMEN: Soft, Nontender, Nondistended; Bowel sounds present  SKIN: warm, dry  NEURO: AAOX3, no focal deficits  PSYCH: normal affect    35minutes spent on discharge

## 2021-01-12 ENCOUNTER — TRANSCRIPTION ENCOUNTER (OUTPATIENT)
Age: 66
End: 2021-01-12

## 2021-01-12 RX ORDER — DEXAMETHASONE 0.5 MG/5ML
1 ELIXIR ORAL
Qty: 6 | Refills: 0
Start: 2021-01-12 | End: 2021-01-17

## 2021-01-13 ENCOUNTER — TRANSCRIPTION ENCOUNTER (OUTPATIENT)
Age: 66
End: 2021-01-13

## 2021-01-15 ENCOUNTER — TRANSCRIPTION ENCOUNTER (OUTPATIENT)
Age: 66
End: 2021-01-15

## 2021-01-25 PROBLEM — J43.9 EMPHYSEMA, UNSPECIFIED: Chronic | Status: ACTIVE | Noted: 2021-01-07

## 2021-02-16 ENCOUNTER — APPOINTMENT (OUTPATIENT)
Dept: INTERNAL MEDICINE | Facility: CLINIC | Age: 66
End: 2021-02-16
Payer: COMMERCIAL

## 2021-02-16 VITALS
BODY MASS INDEX: 24.27 KG/M2 | TEMPERATURE: 97.5 F | HEIGHT: 63 IN | WEIGHT: 137 LBS | DIASTOLIC BLOOD PRESSURE: 84 MMHG | SYSTOLIC BLOOD PRESSURE: 133 MMHG

## 2021-02-16 PROCEDURE — 99072 ADDL SUPL MATRL&STAF TM PHE: CPT

## 2021-02-16 PROCEDURE — 99215 OFFICE O/P EST HI 40 MIN: CPT

## 2021-02-16 NOTE — REVIEW OF SYSTEMS
[Fatigue] : fatigue [Negative] : Heme/Lymph [Chest Pain] : no chest pain [Palpitations] : no palpitations [Orthopnea] : no orthopnea [Shortness Of Breath] : no shortness of breath [Wheezing] : no wheezing [Cough] : no cough [Headache] : no headache [Dizziness] : no dizziness [Memory Loss] : no memory loss [Unsteady Walking] : no ataxia [de-identified] : no brain fog - no loss taste

## 2021-02-16 NOTE — PHYSICAL EXAM
[No Acute Distress] : no acute distress [Well Nourished] : well nourished [Well Developed] : well developed [Well-Appearing] : well-appearing [Normal Sclera/Conjunctiva] : normal sclera/conjunctiva [PERRL] : pupils equal round and reactive to light [EOMI] : extraocular movements intact [Normal Outer Ear/Nose] : the outer ears and nose were normal in appearance [No JVD] : no jugular venous distention [Normal Oropharynx] : the oropharynx was normal [No Lymphadenopathy] : no lymphadenopathy [Supple] : supple [Thyroid Normal, No Nodules] : the thyroid was normal and there were no nodules present [No Respiratory Distress] : no respiratory distress  [No Accessory Muscle Use] : no accessory muscle use [Clear to Auscultation] : lungs were clear to auscultation bilaterally [Normal Rate] : normal rate  [Regular Rhythm] : with a regular rhythm [Normal S1, S2] : normal S1 and S2 [No Murmur] : no murmur heard [No Carotid Bruits] : no carotid bruits [No Abdominal Bruit] : a ~M bruit was not heard ~T in the abdomen [No Varicosities] : no varicosities [Pedal Pulses Present] : the pedal pulses are present [No Edema] : there was no peripheral edema [No Palpable Aorta] : no palpable aorta [No Extremity Clubbing/Cyanosis] : no extremity clubbing/cyanosis [Soft] : abdomen soft [Non Tender] : non-tender [Non-distended] : non-distended [No Masses] : no abdominal mass palpated [No HSM] : no HSM [Normal Bowel Sounds] : normal bowel sounds [Normal Posterior Cervical Nodes] : no posterior cervical lymphadenopathy [Normal Anterior Cervical Nodes] : no anterior cervical lymphadenopathy [No CVA Tenderness] : no CVA  tenderness [No Spinal Tenderness] : no spinal tenderness [No Joint Swelling] : no joint swelling [No Rash] : no rash [Grossly Normal Strength/Tone] : grossly normal strength/tone [Coordination Grossly Intact] : coordination grossly intact [No Focal Deficits] : no focal deficits [Normal Gait] : normal gait [Deep Tendon Reflexes (DTR)] : deep tendon reflexes were 2+ and symmetric [Normal Affect] : the affect was normal [Normal Insight/Judgement] : insight and judgment were intact

## 2021-02-16 NOTE — HISTORY OF PRESENT ILLNESS
[FreeTextEntry1] : HSP F/UP FOR COVID [de-identified] : Patient here on followup after recent admission to touch her uterus a hospital for coated. She had high fever and desaturated to low 80s without evidence of pneumonia. She was treated with a course read that severe and prednisone and had last chest x-ray that was normal. She didn't suffer any myocardial or neurological complications or sequelae. She is asymptomatic today. There is no legs loss of taste or smell and no residual fatigue or brain fog\par POXIM 97% now on RA\par \par

## 2021-02-16 NOTE — DATA REVIEWED
[FreeTextEntry1] : Wrap Text: Off \par Progress Note:\par - Provider Specialty Infectious Disease\par \par \par - Subjective and Objective:\par [facility]\par INFECTIOUS DISEASES AND INTERNAL MEDICINE at [place]\par =======================================================\par Parmjit Caballero MD\par Payal Nazario MD\par Diplomates American Board of Internal Medicine and Infectious Diseases\par Tel: [phone] Fax: [phone]\par =======================================================\par \par JERONIMO MORALES [id number]\par \par Follow up: COVID 19\par \par No fever or chills\par Still on O2, on NC\par \par \par Allergies:\par Ceclor (Swelling)\par codeine (Vomiting)\par penicillin (Unknown)\par \par \par REVIEW OF SYSTEMS:\par CONSTITUTIONAL: No Fever or chills\par HEENT: No diplopia or blurred vision. No earache, sore throat or runny nose.\par CARDIOVASCULAR: No pressure, squeezing, strangling, tightness, heaviness or\par aching about the chest, neck, axilla or epigastrium.\par RESPIRATORY: + cough, + shortness of breath\par GASTROINTESTINAL: No nausea, vomiting or diarrhea.\par GENITOURINARY: No dysuria, frequency or urgency. No Blood in urine\par MUSCULOSKELETAL: no joint aches, no muscle pain\par SKIN: No change in skin, hair or nails.\par NEUROLOGIC: No Headaches, seizures or weakness.\par PSYCHIATRIC: No disorder of thought or mood.\par ENDOCRINE: No heat or cold intolerance\par HEMATOLOGICAL: No easy bruising or bleeding.\par \par \par Physical Exam:\par GEN: NAD, pleasant\par HEENT: normocephalic and atraumatic. EOMI. PERRL. Anicteric\par NECK: Supple.\par LUNGS: Course BS B/L\par HEART: Regular rate and rhythm\par ABDOMEN: Soft, nontender, and nondistended. Positive bowel sounds.\par : No CVA tenderness\par EXTREMITIES: Without any edema.\par MSK: no joint swelling\par NEUROLOGIC: No focal deficits\par PSYCHIATRIC: Appropriate affect .\par SKIN: No Rash\par \par \par Vitals:\par  Vital Signs Last 24 Hrs\par T(C): 36.7 (11 Jan 2021 10:15), Max: 36.8 (10 Wayne 2021 23:00)\par T(F): 98 (11 Jan 2021 10:15), Max: 98.3 (10 Wayne 2021 23:00)\par HR: 71 (11 Jan 2021 10:15) (61 - 71)\par BP: 106/69 (11 Jan 2021 10:15) (106/69 - 125/72)\par BP(mean): --\par RR: 18 (11 Jan 2021 11:00) (18 - 18)\par SpO2: 93% (11 Jan 2021 11:00) (89% - 96%)\par \par \par Current Antibiotics:\par remdesivir IVPB 100 milliGRAM(s) IV Intermittent every 24 hours\par remdesivir IVPB IV Intermittent\par trimethoprim 160 mG/sulfamethoxazole 800 mG 1 Tablet(s) Oral two times a day\par \par \par Other medications:\par aspirin enteric coated 81 milliGRAM(s) Oral daily\par atorvastatin 80 milliGRAM(s) Oral at bedtime\par clopidogrel Tablet 75 milliGRAM(s) Oral daily\par dexAMETHasone Tablet 6 milliGRAM(s) Oral daily\par dextrose 40% Gel 15 Gram(s) Oral once\par dextrose 5%. 1000 milliLiter(s) IV Continuous <Continuous>\par dextrose 5%. 1000 milliLiter(s) IV Continuous <Continuous>\par dextrose 50% Injectable 25 Gram(s) IV Push once\par dextrose 50% Injectable 12.5 Gram(s) IV Push once\par dextrose 50% Injectable 25 Gram(s) IV Push once\par enoxaparin Injectable 40 milliGRAM(s) SubCutaneous daily\par glucagon Injectable 1 milliGRAM(s) IntraMuscular once\par insulin lispro (ADMELOG) corrective regimen sliding scale SubCutaneous three\par times a day before meals\par saccharomyces boulardii 250 milliGRAM(s) Oral two times a day\par tiotropium 18 MICROgram(s) Capsule 1 Capsule(s) Inhalation daily\par \par \par Labs:\par \par  13.5\par 6.24 )-----------( 209 ( 10 Wayne 2021 08:59 )\par  40.6\par 01-11\par \par x | x | x\par ----------------------------< x\par x | x | 0.80\par \par Ca 8.9 10 Wayne 2021 08:59\par \par TPro 6.4<L> / Alb 3.6 / TBili 0.4 / DBili 0.1 / AST 44<H> / ALT\par 20 / AlkPhos 64 01-11\par \par \par \par Culture - Urine (collected 01-07-21 @ 15:02)\par Source: .Urine Clean Catch (Midstream)\par Final Report (01-08-21 @ 13:38):\par  <10,000 CFU/mL Normal Urogenital Trinidad\par \par WBC Count: 3.99 K/uL (01-07-21 @ 09:24)\par \par Creatinine, Serum: 0.89 mg/dL (01-08-21 @ 06:26)\par Creatinine, Serum: 0.91 mg/dL (01-08-21 @ 06:25)\par Creatinine, Serum: 1.15 mg/dL (01-07-21 @ 13:31)\par Creatinine, Serum: 1.42 mg/dL (01-07-21 @ 09:24)\par \par C-Reactive Protein, Serum: 9.11 mg/dL (01-07-21 @ 09:24)\par \par Ferritin, Serum: 546 ng/mL (01-07-21 @ 09:24)\par \par Procalcitonin, Serum: 0.16 ng/mL (01-07-21 @ 09:24)\par \par Rapid RVP Result: Detected (01-07-21 @ 09:45)\par SARS-CoV-2: Detected (01-07-21 @ 09:45)\par \par COVID-19 IgG Antibody Interpretation: Negative (01-08-21 @ 12:00)\par COVID-19 IgG Antibody Index: 0.07 Index (01-08-21 @ 12:00)\par \par \par < from: Xray Chest 1 View- PORTABLE-Urgent (01.07.21 @ 10:28) >\par EXAM: XR CHEST PORTABLE URGENT 1V\par \par PROCEDURE DATE: 01/07/2021\par \par \par \par INTERPRETATION: TECHNIQUE: Single portable view of the chest.\par \par COMPARISON: CT chest dated 10/8/2020\par \par CLINICAL HISTORY: Shortness of Breath, Cough, Fever\par \par FINDINGS:\par \par Single frontal view of the chest demonstrates 5 mm left lower lobe pulmonary\par granuloma. No pneumonia. The cardiomediastinal silhouette is normal. No acute\par osseous abnormalities.\par \par IMPRESSION: No pneumonia.\par \par < end of copied text >\par \par \par \par \par Assessment and Plan:\par - Assessment \par 65F who presented with a two day history of increasing dyspnea and\par non-productive cough. She also reported episodes of fevers, chills, malaise,\par and myalgias. The patient had been exposed to other family members who had\par tested positive for COVID-19. She had quarantined at home, and had previously\par been tested negative multiple times. She had taken ibuprofen at home with\par minimal relief of symptoms. She denied any similar symptoms in the past and\par noted that he dyspnea was worse with ambulation. In the emergency department,\par the patient was noted to have hypoxia with ambulation. She denied any\par associated chest pain, palpitations, orthopnea, or lower extremity edema. She\par did report mild nausea and poor appetite and poor oral intake over the past two\par days.\par \par \par Acute Hypoxic respiratory failure\par COVID-19 infection\par B/L Pneumonia\par cough\par fevers\par shortness of breath\par \par \par - COVID 19 PCR positive\par - CXR reporting no pneumonia\par - Continue supportive care measures\par - PT CLINICALLY IMPROVED ON DECADRON REMDESIVIR\par ON ROOM AIR STABLE FOR DISCHARGE\par WILL FOLLOW UP AS NEEDED\par \par \par \par Electronic Signatures:\par Florentin Santana) (Signed 11-Jan-2021 15:00)\par 	Authored: Progress Note, Subjective and Objective, Assessment and Plan\par \par

## 2021-02-16 NOTE — ASSESSMENT
[FreeTextEntry1] : Patient high risk with both diabetes coronary artery disease and elevated cholesterol and was admitted with covert 19 respiratory symptoms. She responded very nicely to a course of treatment with prednisone and remdesavir and did not need ICU admission\par She is asymptomatic today with normal physical exam and normal pulse oximetry on room.\par She did not require anticoagulants. We did discuss follow vaccination which I recommended that she get scheduled now for it.\par Overall patient's condition is very good and will followup for medical issues in 2 months\par \par Extensive medical records were reviewed both [facility] records let and x-rays in consultative note prior to evaluation of the patient \par In addition extensive time was also spent in reviewing diagnostic studies.\par \par The duration of the review took 40minutes\par \par This was an extended visit lasting 70\par minutes, including review of prior notes laboratory data and examination and discussing with patient including completion of current note.\par \par \par \par \par

## 2021-03-08 ENCOUNTER — RX RENEWAL (OUTPATIENT)
Age: 66
End: 2021-03-08

## 2021-05-25 ENCOUNTER — APPOINTMENT (OUTPATIENT)
Dept: INTERNAL MEDICINE | Facility: CLINIC | Age: 66
End: 2021-05-25
Payer: COMMERCIAL

## 2021-05-25 VITALS
TEMPERATURE: 97 F | WEIGHT: 141 LBS | HEIGHT: 63 IN | DIASTOLIC BLOOD PRESSURE: 70 MMHG | SYSTOLIC BLOOD PRESSURE: 140 MMHG | BODY MASS INDEX: 24.98 KG/M2

## 2021-05-25 DIAGNOSIS — U07.1 COVID-19: ICD-10-CM

## 2021-05-25 DIAGNOSIS — J12.82 COVID-19: ICD-10-CM

## 2021-05-25 PROCEDURE — 99215 OFFICE O/P EST HI 40 MIN: CPT | Mod: 25

## 2021-05-25 PROCEDURE — 36415 COLL VENOUS BLD VENIPUNCTURE: CPT

## 2021-05-25 PROCEDURE — 99072 ADDL SUPL MATRL&STAF TM PHE: CPT

## 2021-05-25 PROCEDURE — G0444 DEPRESSION SCREEN ANNUAL: CPT | Mod: 59

## 2021-05-25 RX ADMIN — ESCITALOPRAM OXALATE 1 MG: 5 TABLET, FILM COATED ORAL at 00:00

## 2021-05-25 NOTE — REVIEW OF SYSTEMS
[Anxiety] : anxiety [Depression] : depression [Suicidal] : not suicidal [Insomnia] : no insomnia [Negative] : Heme/Lymph

## 2021-05-25 NOTE — HEALTH RISK ASSESSMENT
[No] : No [] : No [3] : 2) Feeling down, depressed, or hopeless for nearly every day (3) [FreeTextEntry1] : lexapro5 and rt therapist if no better within 4-6 weeks [[id number]] : 6

## 2021-05-25 NOTE — HISTORY OF PRESENT ILLNESS
[FreeTextEntry1] : Patient here for evaluation of multiple medical problems and new issues to be discussed\par  [de-identified] : Patient is here for routine follow-up of her multiple medical problems.  She is compliant with all medications has no complaints of chest pain or shortness of breath.  She has fully recovered from COVID-19 associated pneumonia with normal pulse oximetry.  She is up-to-date with subspecialty visits\par Major problem now is significant new anxiety and mild depression.  Patient prefers not to leave the house and cries easily.  She is not suicidal.  It should be noted April is the anniversary of her's brother's death which was very traumatic\par \par \par \par

## 2021-05-26 LAB
ALBUMIN SERPL ELPH-MCNC: 4.7 G/DL
ALP BLD-CCNC: 121 U/L
ALT SERPL-CCNC: 20 U/L
ANION GAP SERPL CALC-SCNC: 15 MMOL/L
AST SERPL-CCNC: 21 U/L
BILIRUB SERPL-MCNC: 0.3 MG/DL
BUN SERPL-MCNC: 23 MG/DL
CALCIUM SERPL-MCNC: 10.5 MG/DL
CHLORIDE SERPL-SCNC: 102 MMOL/L
CHOLEST SERPL-MCNC: 162 MG/DL
CO2 SERPL-SCNC: 22 MMOL/L
CREAT SERPL-MCNC: 0.91 MG/DL
ESTIMATED AVERAGE GLUCOSE: 123 MG/DL
GLUCOSE SERPL-MCNC: 96 MG/DL
HBA1C MFR BLD HPLC: 5.9 %
HDLC SERPL-MCNC: 69 MG/DL
LDLC SERPL CALC-MCNC: 70 MG/DL
NONHDLC SERPL-MCNC: 94 MG/DL
POTASSIUM SERPL-SCNC: 5 MMOL/L
PROT SERPL-MCNC: 7.3 G/DL
SODIUM SERPL-SCNC: 138 MMOL/L
TRIGL SERPL-MCNC: 117 MG/DL

## 2021-05-28 ENCOUNTER — NON-APPOINTMENT (OUTPATIENT)
Age: 66
End: 2021-05-28

## 2021-06-02 ENCOUNTER — RX RENEWAL (OUTPATIENT)
Age: 66
End: 2021-06-02

## 2021-06-14 ENCOUNTER — RX RENEWAL (OUTPATIENT)
Age: 66
End: 2021-06-14

## 2021-06-20 ENCOUNTER — RX RENEWAL (OUTPATIENT)
Age: 66
End: 2021-06-20

## 2021-06-20 ENCOUNTER — RX CHANGE (OUTPATIENT)
Age: 66
End: 2021-06-20

## 2021-09-01 PROBLEM — I73.9 PERIPHERAL VASCULAR DISORDER: Status: ACTIVE | Noted: 2019-04-16

## 2021-09-01 PROBLEM — F41.9 ANXIETY AND DEPRESSION: Status: ACTIVE | Noted: 2021-05-25

## 2021-09-02 ENCOUNTER — APPOINTMENT (OUTPATIENT)
Dept: INTERNAL MEDICINE | Facility: CLINIC | Age: 66
End: 2021-09-02
Payer: COMMERCIAL

## 2021-09-02 VITALS
HEIGHT: 63 IN | DIASTOLIC BLOOD PRESSURE: 70 MMHG | BODY MASS INDEX: 24.45 KG/M2 | WEIGHT: 138 LBS | TEMPERATURE: 96.6 F | SYSTOLIC BLOOD PRESSURE: 120 MMHG

## 2021-09-02 DIAGNOSIS — I73.9 PERIPHERAL VASCULAR DISEASE, UNSPECIFIED: ICD-10-CM

## 2021-09-02 DIAGNOSIS — F32.9 ANXIETY DISORDER, UNSPECIFIED: ICD-10-CM

## 2021-09-02 DIAGNOSIS — F41.9 ANXIETY DISORDER, UNSPECIFIED: ICD-10-CM

## 2021-09-02 DIAGNOSIS — F17.200 NICOTINE DEPENDENCE, UNSPECIFIED, UNCOMPLICATED: ICD-10-CM

## 2021-09-02 PROCEDURE — 99213 OFFICE O/P EST LOW 20 MIN: CPT

## 2021-09-02 NOTE — HISTORY OF PRESENT ILLNESS
[FreeTextEntry1] : Patient here for evaluation of multiple medical problems and new issues to be discussed\par  [de-identified] : \par Patient here for follow-up from last visit.  She was significant for significantly anxious and depressed and placed on Lexapro 5 mg.  She feels significantly better nonsuicidal which 1.  Unfortunately she resumed smoking and is due for follow-up lung CAT scan.  She has no complaints chest pain or shortness of breath\par

## 2021-09-02 NOTE — ASSESSMENT
[FreeTextEntry1] : Patient examined and adjustments to therapy for HBP not needed all labs reviewed with patient as well. Review of systems and physical exam discussed with patient. Care plan for future followups discussed with patient. All issues of health for the current year discussed in depth with patient who was conversant and all relevant issues addressed.  Cholesterol levels discussed with patient. Compliance with oral medication were also addressed . Ideal LDL levels were  discussed with the patient. All issues regarding the implications of high cholesterol necessity for treatment were discussed with the patient who is conversant and all relevant questions were asked and answered.\par Results of current evaluation discussed with patient. Medications were reviewed and all relevant labs as well as a 1C level and glucose levels were discussed in depth with  patient. Further options for ideal control were discussed, long-term complications of diabetes and screening for complications were also discussed. Patient was conversant and all relevant questions were asked and answered.  Patient's A1c prediabetic 5.9 but on Jardiance due to cardiac history and will remain on it\par Unfortunately patient has resumed cigarette smoking and admonished and discussed.  She will have follow-up CAT scan October 2021 she will attempt to reduce her cigarette use\par Patient significantly better on Lexapro which will be continued\par All issues regarding patient's health and medical problems have been discussed. The patient understands and concurs with the treatment plan.\par This was an extended visit lasting 35 minutes, including review of prior notes laboratory data and examination and discussing with patient including completion of current note.\par \par

## 2021-10-06 PROBLEM — U07.1 PNEUMONIA DUE TO COVID-19 VIRUS: Status: ACTIVE | Noted: 2021-02-15

## 2021-10-11 ENCOUNTER — NON-APPOINTMENT (OUTPATIENT)
Age: 66
End: 2021-10-11

## 2021-10-11 VITALS — WEIGHT: 136 LBS | HEIGHT: 63 IN | BODY MASS INDEX: 24.1 KG/M2

## 2021-10-11 NOTE — HISTORY OF PRESENT ILLNESS
[TextBox_13] : Referred by Dr. Parmjit Carty.\par \par Ms. MORALES is a 66 year old female with a history of CAD s/p stents, HTN, high cholesterol, PVD and emphysema.\par \par She was called to review eligibility for Low-Dose CT lung cancer screening.  Reviewed and confirmed that the patient meets screening eligibility criteria:\par \par 66 years old \par \par Smoking Status: Current Smoker\par \par Number of pack(s) per day: 1\par Number of years smoked: 52\par Number of pack years smokin\par \par Ms. MORALES denies any symptoms of lung cancer, including new cough, change in cough, hemoptysis, and unintentional weight loss.\par \par Ms. MORALES denies any personal history of lung cancer.  No lung cancer in a first degree relative.  Denies any history of occupational exposures.

## 2021-10-11 NOTE — DATA REVIEWED
[Lung Cancer Screening] : Patient underwent lung cancer screening [1] : 1 [S] : S [2] : 2 [TextBox_12] : 6/15 [TextBox_27] : 3/17

## 2021-10-12 ENCOUNTER — APPOINTMENT (OUTPATIENT)
Dept: CT IMAGING | Facility: CLINIC | Age: 66
End: 2021-10-12

## 2021-10-23 ENCOUNTER — RESULT REVIEW (OUTPATIENT)
Age: 66
End: 2021-10-23

## 2021-10-23 ENCOUNTER — OUTPATIENT (OUTPATIENT)
Dept: OUTPATIENT SERVICES | Facility: HOSPITAL | Age: 66
LOS: 1 days | End: 2021-10-23
Payer: COMMERCIAL

## 2021-10-23 ENCOUNTER — APPOINTMENT (OUTPATIENT)
Dept: CT IMAGING | Facility: CLINIC | Age: 66
End: 2021-10-23

## 2021-10-23 DIAGNOSIS — Z41.9 ENCOUNTER FOR PROCEDURE FOR PURPOSES OTHER THAN REMEDYING HEALTH STATE, UNSPECIFIED: Chronic | ICD-10-CM

## 2021-10-23 DIAGNOSIS — Z95.5 PRESENCE OF CORONARY ANGIOPLASTY IMPLANT AND GRAFT: Chronic | ICD-10-CM

## 2021-10-23 DIAGNOSIS — Z12.2 ENCOUNTER FOR SCREENING FOR MALIGNANT NEOPLASM OF RESPIRATORY ORGANS: ICD-10-CM

## 2021-10-23 DIAGNOSIS — Z90.89 ACQUIRED ABSENCE OF OTHER ORGANS: Chronic | ICD-10-CM

## 2021-10-23 DIAGNOSIS — Z90.49 ACQUIRED ABSENCE OF OTHER SPECIFIED PARTS OF DIGESTIVE TRACT: Chronic | ICD-10-CM

## 2021-10-23 PROCEDURE — 71271 CT THORAX LUNG CANCER SCR C-: CPT | Mod: 26

## 2021-11-02 ENCOUNTER — NON-APPOINTMENT (OUTPATIENT)
Age: 66
End: 2021-11-02

## 2021-11-15 ENCOUNTER — RX RENEWAL (OUTPATIENT)
Age: 66
End: 2021-11-15

## 2022-02-12 ENCOUNTER — RX RENEWAL (OUTPATIENT)
Age: 67
End: 2022-02-12

## 2022-02-22 ENCOUNTER — APPOINTMENT (OUTPATIENT)
Dept: INTERNAL MEDICINE | Facility: CLINIC | Age: 67
End: 2022-02-22
Payer: COMMERCIAL

## 2022-02-22 VITALS
DIASTOLIC BLOOD PRESSURE: 70 MMHG | SYSTOLIC BLOOD PRESSURE: 120 MMHG | WEIGHT: 135 LBS | HEIGHT: 63 IN | BODY MASS INDEX: 23.92 KG/M2

## 2022-02-22 DIAGNOSIS — Z87.891 PERSONAL HISTORY OF NICOTINE DEPENDENCE: ICD-10-CM

## 2022-02-22 PROCEDURE — 90715 TDAP VACCINE 7 YRS/> IM: CPT

## 2022-02-22 PROCEDURE — 90471 IMMUNIZATION ADMIN: CPT

## 2022-02-22 PROCEDURE — 99215 OFFICE O/P EST HI 40 MIN: CPT | Mod: 25

## 2022-02-22 NOTE — HISTORY OF PRESENT ILLNESS
[FreeTextEntry1] : Patient here for evaluation of multiple medical problems and new issues to be discussed\par  [de-identified] : \par Patient here for follow-up from last visit.  She was significant for significantly anxious and depressed and placed on Lexapro 5 mg.  She feels significantly better nonsuicidal which 1.  Unfortunately she resumed smoking and is due for follow-up lung CAT scan.  She has no complaints chest pain or shortness of breath\par \par [id number]\par Patient had routine follow-up with her medical problems.  She still continues to smoke 1 pack/day.  She denies chest pain or shortness of breath and is physically active.  She is compliant with all her medications.  She is up-to-date with Covid vaccinations\par \par

## 2022-02-22 NOTE — ASSESSMENT
[Z87.891   Personal history of nicotine dependence] : budgerigar-fanciers' disease [FreeTextEntry1] : Patient examined and adjustments to therapy for HBP not needed all labs reviewed with patient as well. Review of systems and physical exam discussed with patient. Care plan for future followups discussed with patient. All issues of health for the current year discussed in depth with patient who was conversant and all relevant issues addressed.\par Cholesterol levels discussed with patient. Compliance with oral medication were also addressed . Ideal LDL levels were  discussed with the patient. All issues regarding the implications of high cholesterol necessity for treatment were discussed with the patient who is conversant and all relevant questions were asked and answered.  Has been under excellent control and will recheck\par Patient prediabetic under control with Jardiance\par Immunizations discussed and patient received tetanus diphtheria pertussis and will require Pneumo 23 at next visit\par Smoking continues but patient is not up to her stopping at the current time\par Patient has been on aspirin and Plavix since cardiac stent have asked him to discuss with cardiologist regarding cessation of either 1 going forward\par All issues regarding patient's health and medical problems have been discussed. The patient understands and concurs with the treatment plan.

## 2022-02-23 LAB
ALBUMIN SERPL ELPH-MCNC: 4.9 G/DL
ALP BLD-CCNC: 139 U/L
ALT SERPL-CCNC: 17 U/L
ANION GAP SERPL CALC-SCNC: 14 MMOL/L
AST SERPL-CCNC: 18 U/L
BILIRUB SERPL-MCNC: 0.3 MG/DL
BUN SERPL-MCNC: 23 MG/DL
CALCIUM SERPL-MCNC: 10.4 MG/DL
CHLORIDE SERPL-SCNC: 102 MMOL/L
CHOLEST SERPL-MCNC: 163 MG/DL
CO2 SERPL-SCNC: 24 MMOL/L
CREAT SERPL-MCNC: 0.9 MG/DL
ESTIMATED AVERAGE GLUCOSE: 123 MG/DL
GLUCOSE SERPL-MCNC: 106 MG/DL
HBA1C MFR BLD HPLC: 5.9 %
HDLC SERPL-MCNC: 67 MG/DL
LDLC SERPL CALC-MCNC: 70 MG/DL
NONHDLC SERPL-MCNC: 96 MG/DL
POTASSIUM SERPL-SCNC: 4.7 MMOL/L
PROT SERPL-MCNC: 7.2 G/DL
SODIUM SERPL-SCNC: 139 MMOL/L
TRIGL SERPL-MCNC: 131 MG/DL

## 2022-02-24 ENCOUNTER — NON-APPOINTMENT (OUTPATIENT)
Age: 67
End: 2022-02-24

## 2022-03-15 NOTE — ASSESSMENT
----- Message from Julio Cesar Coyle MD sent at 3/14/2022  9:13 AM CDT -----  Minor anemia, stable estrogens, some minor lower testosterone levels, can do supplement if pt desires   [FreeTextEntry1] : Wellness exam completed. All issues of health and screening up to date. Immunizations and screening reviewed with patient. All issues of health for the current year discussed in depth with patient. Patient was conversant during the exam and all pertinent issues addressed. Depression screen 2 in chart. Fall prevention was addressed.\par \par Patient with history heart disease with a stent and also peripheral vascular disease but continues to smoke. This was discussed again in great depth and she was counseled for 10 minutes regarding tobacco cessation as documented\par \par Cholesterol levels discussed with patient. Compliance with oral medication were also addressed . Ideal LDL levels were  discussed with the patient. All issues regarding the implications of high cholesterol necessity for treatment were discussed with the patient who is conversant and all relevant questions were asked and answered. Repeat labs will be drawn with LDL goal less than 70 which has been in the past\par \par Patient examined and adjustments to therapy for HBP not required All labs reviewed with patient as well. Review of systems and physical exam discussed with patient. Care plan for future followups discussed with patient. All issues of health for the current year discussed in depth with patient who was conversant and all relevant issues addressed.\par \par Results of current evaluation discussed with patient. Medications were reviewed and all relevant labs as well as a 1C level and glucose levels were discussed in depth with  patient. Further options for ideal control were discussed, long-term complications of diabetes and screening for complications were also discussed. Patient was conversant and all relevant questions were asked and answered. A1c has been under excellent control\par \par All issues regarding patient's health and medical problems have been discussed. The patient understands and concurs with the treatment plan.\par \par Patient also received high-dose influenza vaccinations up-to-date with all vaccines

## 2022-03-16 ENCOUNTER — RX RENEWAL (OUTPATIENT)
Age: 67
End: 2022-03-16

## 2022-04-09 ENCOUNTER — NON-APPOINTMENT (OUTPATIENT)
Age: 67
End: 2022-04-09

## 2022-04-11 ENCOUNTER — RX RENEWAL (OUTPATIENT)
Age: 67
End: 2022-04-11

## 2022-09-28 NOTE — ED ADULT TRIAGE NOTE - NS ED TRIAGE AVPU SCALE
Alert-The patient is alert, awake and responds to voice. The patient is oriented to time, place, and person. The triage nurse is able to obtain subjective information.
87

## 2022-11-11 ENCOUNTER — RX RENEWAL (OUTPATIENT)
Age: 67
End: 2022-11-11

## 2022-12-14 ENCOUNTER — RX RENEWAL (OUTPATIENT)
Age: 67
End: 2022-12-14

## 2022-12-29 ENCOUNTER — APPOINTMENT (OUTPATIENT)
Dept: INTERNAL MEDICINE | Facility: CLINIC | Age: 67
End: 2022-12-29
Payer: MEDICARE

## 2022-12-29 VITALS
HEIGHT: 63 IN | DIASTOLIC BLOOD PRESSURE: 75 MMHG | SYSTOLIC BLOOD PRESSURE: 130 MMHG | WEIGHT: 146 LBS | BODY MASS INDEX: 25.87 KG/M2

## 2022-12-29 DIAGNOSIS — Z00.01 ENCOUNTER FOR GENERAL ADULT MEDICAL EXAMINATION WITH ABNORMAL FINDINGS: ICD-10-CM

## 2022-12-29 DIAGNOSIS — Z23 ENCOUNTER FOR IMMUNIZATION: ICD-10-CM

## 2022-12-29 PROCEDURE — 99406 BEHAV CHNG SMOKING 3-10 MIN: CPT

## 2022-12-29 PROCEDURE — 36415 COLL VENOUS BLD VENIPUNCTURE: CPT

## 2022-12-29 PROCEDURE — G0008: CPT

## 2022-12-29 PROCEDURE — 90662 IIV NO PRSV INCREASED AG IM: CPT | Mod: 59

## 2022-12-29 PROCEDURE — G0296 VISIT TO DETERM LDCT ELIG: CPT

## 2022-12-29 PROCEDURE — 99214 OFFICE O/P EST MOD 30 MIN: CPT | Mod: 25

## 2022-12-29 NOTE — HISTORY OF PRESENT ILLNESS
[FreeTextEntry1] : Patient here for evaluation of multiple medical problems and new issues to be discussed\par wellness [de-identified] : \par Patient here for follow-up from last visit.  She was significant for significantly anxious and depressed and placed on Lexapro 5 mg.  She feels significantly better nonsuicidal which 1.  Unfortunately she resumed smoking and is due for follow-up lung CAT scan.  She has no complaints chest pain or shortness of breath\par \par [id number]\par Patient had routine follow-up with her medical problems.  She still continues to smoke 1 pack/day.  She denies chest pain or shortness of breath and is physically active.  She is compliant with all her medications.  She is up-to-date with Covid vaccinations\par \par [id number]\par Is first visit 67-year-old 67-year-old woman for routine follow-up.  She has not been seen since February.  She is feeling more depressed during the holiday seasons and feels that she might need more Lexapro.  She has been compliant with all medications and has received COVID booster but not influenza.  She has no complaints chest pain or shortness of breath and other than mild depression feels well.  She is up-to-date with cardiology.  She is still smoking and requires yearly CAT scan\par \par

## 2022-12-29 NOTE — ASSESSMENT
[FreeTextEntry1] : Patient examined and adjustments to therapy for HBP not needed all labs reviewed with patient as well. Review of systems and physical exam discussed with patient. Care plan for future followups discussed with patient. All issues of health for the current year discussed in depth with patient who was conversant and all relevant issues addressed.\par Cholesterol levels discussed with patient. Compliance with oral medication were also addressed . Ideal LDL levels were  discussed with the patient. All issues regarding the implications of high cholesterol necessity for treatment were discussed with the patient who is conversant and all relevant questions were asked and answered.\par Discussed depression with patient increase Lexapro to 10 mg a day\par Influenza vaccine given\par All issues regarding patient's health and medical problems have been discussed. The patient understands and concurs with the treatment plan.\par Follow-up 4 months wellness

## 2022-12-29 NOTE — COUNSELING
[Yes] : Risk of tobacco use and health benefits of smoking cessation discussed: Yes [Cessation strategies including cessation program discussed] : Cessation strategies including cessation program discussed [Use of nicotine replacement therapies and other medications discussed] : Use of nicotine replacement therapies and other medications discussed [No] : Not willing to quit smoking [FreeTextEntry1] : 10 [ - Annual Lung Cancer Screening/Share Decision Making Discussion] : Annual Lung Cancer Screening/Share Decision Making Discussion. (I have advised this patient to have a Low Dose CT (LDCT) scan of the lungs and have discussed the following with the patient in a shared decision making discussion:   Benefits of Detection and Early Treatment: There is adequate evidence that annual screening for lung cancer with LDCT in a population of high-risk persons can prevent a substantial number of lung cancer–related deaths. The magnitude of benefit depends on the individual patient's risk for lung cancer, as those who are at highest risk are most likely to benefit. Screening cannot prevent most lung cancer–related deaths, and does not replace smoking cessation. Harms of Detection and Early Intervention and Treatment: The harms associated with LDCT screening include false-negative and false-positive results, incidental findings, over diagnosis, and radiation exposure. False-positive LDCT results occur in a substantial proportion of screened persons; 95% of all positive results do not lead to a diagnosis of cancer. In a high-quality screening program, further imaging can resolve most false-positive results; however, some patients may require invasive procedures. Radiation harms, including cancer resulting from cumulative exposure to radiation, vary depending on the age at the start of screening; the number of scans received; and the person's exposure to other sources of radiation, particularly other medical imaging.) [None] : None

## 2022-12-30 LAB
ALBUMIN SERPL ELPH-MCNC: 4.6 G/DL
ALP BLD-CCNC: 104 U/L
ALT SERPL-CCNC: 17 U/L
ANION GAP SERPL CALC-SCNC: 15 MMOL/L
AST SERPL-CCNC: 21 U/L
BASOPHILS # BLD AUTO: 0.08 K/UL
BASOPHILS NFR BLD AUTO: 1 %
BILIRUB SERPL-MCNC: 0.4 MG/DL
BUN SERPL-MCNC: 28 MG/DL
CALCIUM SERPL-MCNC: 9.8 MG/DL
CHLORIDE SERPL-SCNC: 97 MMOL/L
CHOLEST SERPL-MCNC: 169 MG/DL
CO2 SERPL-SCNC: 23 MMOL/L
CREAT SERPL-MCNC: 1.08 MG/DL
EGFR: 56 ML/MIN/1.73M2
EOSINOPHIL # BLD AUTO: 0.27 K/UL
EOSINOPHIL NFR BLD AUTO: 3.4 %
GLUCOSE SERPL-MCNC: 79 MG/DL
HCT VFR BLD CALC: 44.9 %
HDLC SERPL-MCNC: 62 MG/DL
HGB BLD-MCNC: 15.3 G/DL
IMM GRANULOCYTES NFR BLD AUTO: 0.6 %
LDLC SERPL CALC-MCNC: 75 MG/DL
LYMPHOCYTES # BLD AUTO: 2.61 K/UL
LYMPHOCYTES NFR BLD AUTO: 32.8 %
MAN DIFF?: NORMAL
MCHC RBC-ENTMCNC: 31.3 PG
MCHC RBC-ENTMCNC: 34.1 GM/DL
MCV RBC AUTO: 91.8 FL
MONOCYTES # BLD AUTO: 0.72 K/UL
MONOCYTES NFR BLD AUTO: 9 %
NEUTROPHILS # BLD AUTO: 4.23 K/UL
NEUTROPHILS NFR BLD AUTO: 53.2 %
NONHDLC SERPL-MCNC: 107 MG/DL
PLATELET # BLD AUTO: 222 K/UL
POTASSIUM SERPL-SCNC: 5.2 MMOL/L
PROT SERPL-MCNC: 6.8 G/DL
RBC # BLD: 4.89 M/UL
RBC # FLD: 14.1 %
SODIUM SERPL-SCNC: 135 MMOL/L
TRIGL SERPL-MCNC: 156 MG/DL
WBC # FLD AUTO: 7.96 K/UL

## 2023-01-03 ENCOUNTER — RX RENEWAL (OUTPATIENT)
Age: 68
End: 2023-01-03

## 2023-03-19 ENCOUNTER — RX RENEWAL (OUTPATIENT)
Age: 68
End: 2023-03-19

## 2023-03-19 RX ORDER — TIOTROPIUM BROMIDE AND OLODATEROL 3.124; 2.736 UG/1; UG/1
2.5-2.5 SPRAY, METERED RESPIRATORY (INHALATION)
Qty: 12 | Refills: 3 | Status: ACTIVE | COMMUNITY
Start: 2020-08-14 | End: 1900-01-01

## 2023-07-25 DIAGNOSIS — R73.03 PREDIABETES.: ICD-10-CM

## 2023-09-11 ENCOUNTER — APPOINTMENT (OUTPATIENT)
Dept: INTERNAL MEDICINE | Facility: CLINIC | Age: 68
End: 2023-09-11
Payer: MEDICARE

## 2023-09-11 VITALS
SYSTOLIC BLOOD PRESSURE: 150 MMHG | DIASTOLIC BLOOD PRESSURE: 80 MMHG | HEIGHT: 63 IN | BODY MASS INDEX: 25.69 KG/M2 | WEIGHT: 145 LBS

## 2023-09-11 DIAGNOSIS — Z12.2 ENCOUNTER FOR SCREENING FOR MALIGNANT NEOPLASM OF RESPIRATORY ORGANS: ICD-10-CM

## 2023-09-11 DIAGNOSIS — F17.218 NICOTINE DEPENDENCE, CIGARETTES, WITH OTHER NICOTINE-INDUCED DISORDERS: ICD-10-CM

## 2023-09-11 DIAGNOSIS — R91.8 OTHER NONSPECIFIC ABNORMAL FINDING OF LUNG FIELD: ICD-10-CM

## 2023-09-11 DIAGNOSIS — F17.210 NICOTINE DEPENDENCE, CIGARETTES, UNCOMPLICATED: ICD-10-CM

## 2023-09-11 PROCEDURE — 99406 BEHAV CHNG SMOKING 3-10 MIN: CPT

## 2023-09-11 PROCEDURE — 99215 OFFICE O/P EST HI 40 MIN: CPT | Mod: 25

## 2023-09-11 PROCEDURE — 36415 COLL VENOUS BLD VENIPUNCTURE: CPT

## 2023-09-11 RX ORDER — DAPAGLIFLOZIN 10 MG/1
10 TABLET, FILM COATED ORAL DAILY
Qty: 90 | Refills: 1 | Status: COMPLETED | COMMUNITY
Start: 2023-07-25 | End: 2023-09-11

## 2023-09-11 RX ORDER — EMPAGLIFLOZIN 10 MG/1
10 TABLET, FILM COATED ORAL
Qty: 90 | Refills: 2 | Status: COMPLETED | COMMUNITY
Start: 2020-10-13 | End: 2023-09-11

## 2023-09-12 LAB
ALBUMIN SERPL ELPH-MCNC: 4.5 G/DL
ALP BLD-CCNC: 121 U/L
ALT SERPL-CCNC: 11 U/L
ANION GAP SERPL CALC-SCNC: 16 MMOL/L
AST SERPL-CCNC: 18 U/L
BASOPHILS # BLD AUTO: 0.1 K/UL
BASOPHILS NFR BLD AUTO: 1.1 %
BILIRUB SERPL-MCNC: 0.4 MG/DL
BUN SERPL-MCNC: 19 MG/DL
CALCIUM SERPL-MCNC: 10.1 MG/DL
CHLORIDE SERPL-SCNC: 102 MMOL/L
CHOLEST SERPL-MCNC: 179 MG/DL
CO2 SERPL-SCNC: 22 MMOL/L
CREAT SERPL-MCNC: 0.83 MG/DL
EGFR: 76 ML/MIN/1.73M2
EOSINOPHIL # BLD AUTO: 0.2 K/UL
EOSINOPHIL NFR BLD AUTO: 2.1 %
ESTIMATED AVERAGE GLUCOSE: 128 MG/DL
GLUCOSE SERPL-MCNC: 77 MG/DL
HBA1C MFR BLD HPLC: 6.1 %
HCT VFR BLD CALC: 47.6 %
HDLC SERPL-MCNC: 67 MG/DL
HGB BLD-MCNC: 16 G/DL
IMM GRANULOCYTES NFR BLD AUTO: 0.6 %
LDLC SERPL CALC-MCNC: 91 MG/DL
LYMPHOCYTES # BLD AUTO: 2.7 K/UL
LYMPHOCYTES NFR BLD AUTO: 28.5 %
MAN DIFF?: NORMAL
MCHC RBC-ENTMCNC: 31.7 PG
MCHC RBC-ENTMCNC: 33.6 GM/DL
MCV RBC AUTO: 94.3 FL
MONOCYTES # BLD AUTO: 0.79 K/UL
MONOCYTES NFR BLD AUTO: 8.3 %
NEUTROPHILS # BLD AUTO: 5.62 K/UL
NEUTROPHILS NFR BLD AUTO: 59.4 %
NONHDLC SERPL-MCNC: 113 MG/DL
PLATELET # BLD AUTO: 242 K/UL
POTASSIUM SERPL-SCNC: 5.2 MMOL/L
PROT SERPL-MCNC: 7 G/DL
RBC # BLD: 5.05 M/UL
RBC # FLD: 14 %
SODIUM SERPL-SCNC: 139 MMOL/L
TRIGL SERPL-MCNC: 126 MG/DL
WBC # FLD AUTO: 9.47 K/UL

## 2023-09-23 ENCOUNTER — EMERGENCY (EMERGENCY)
Facility: HOSPITAL | Age: 68
LOS: 1 days | Discharge: HOME | End: 2023-09-23
Attending: EMERGENCY MEDICINE
Payer: MEDICARE

## 2023-09-23 VITALS
DIASTOLIC BLOOD PRESSURE: 67 MMHG | RESPIRATION RATE: 20 BRPM | HEART RATE: 65 BPM | OXYGEN SATURATION: 95 % | SYSTOLIC BLOOD PRESSURE: 134 MMHG | TEMPERATURE: 98 F

## 2023-09-23 VITALS
DIASTOLIC BLOOD PRESSURE: 78 MMHG | SYSTOLIC BLOOD PRESSURE: 127 MMHG | WEIGHT: 139.99 LBS | RESPIRATION RATE: 20 BRPM | OXYGEN SATURATION: 94 % | TEMPERATURE: 98 F | HEART RATE: 92 BPM

## 2023-09-23 DIAGNOSIS — Z90.49 ACQUIRED ABSENCE OF OTHER SPECIFIED PARTS OF DIGESTIVE TRACT: Chronic | ICD-10-CM

## 2023-09-23 DIAGNOSIS — Z90.89 ACQUIRED ABSENCE OF OTHER ORGANS: Chronic | ICD-10-CM

## 2023-09-23 DIAGNOSIS — Z95.5 PRESENCE OF CORONARY ANGIOPLASTY IMPLANT AND GRAFT: Chronic | ICD-10-CM

## 2023-09-23 DIAGNOSIS — Z41.9 ENCOUNTER FOR PROCEDURE FOR PURPOSES OTHER THAN REMEDYING HEALTH STATE, UNSPECIFIED: Chronic | ICD-10-CM

## 2023-09-23 LAB
ALBUMIN SERPL ELPH-MCNC: 4.1 G/DL — SIGNIFICANT CHANGE UP (ref 3.3–5.2)
ALP SERPL-CCNC: 105 U/L — SIGNIFICANT CHANGE UP (ref 40–120)
ALT FLD-CCNC: 15 U/L — SIGNIFICANT CHANGE UP
ANION GAP SERPL CALC-SCNC: 13 MMOL/L — SIGNIFICANT CHANGE UP (ref 5–17)
AST SERPL-CCNC: 20 U/L — SIGNIFICANT CHANGE UP
BASOPHILS # BLD AUTO: 0.1 K/UL — SIGNIFICANT CHANGE UP (ref 0–0.2)
BASOPHILS NFR BLD AUTO: 0.9 % — SIGNIFICANT CHANGE UP (ref 0–2)
BILIRUB SERPL-MCNC: 0.2 MG/DL — LOW (ref 0.4–2)
BUN SERPL-MCNC: 23.2 MG/DL — HIGH (ref 8–20)
CALCIUM SERPL-MCNC: 9.4 MG/DL — SIGNIFICANT CHANGE UP (ref 8.4–10.5)
CHLORIDE SERPL-SCNC: 97 MMOL/L — SIGNIFICANT CHANGE UP (ref 96–108)
CO2 SERPL-SCNC: 26 MMOL/L — SIGNIFICANT CHANGE UP (ref 22–29)
CREAT SERPL-MCNC: 0.9 MG/DL — SIGNIFICANT CHANGE UP (ref 0.5–1.3)
EGFR: 69 ML/MIN/1.73M2 — SIGNIFICANT CHANGE UP
EOSINOPHIL # BLD AUTO: 0.22 K/UL — SIGNIFICANT CHANGE UP (ref 0–0.5)
EOSINOPHIL NFR BLD AUTO: 1.9 % — SIGNIFICANT CHANGE UP (ref 0–6)
GLUCOSE SERPL-MCNC: 175 MG/DL — HIGH (ref 70–99)
HCT VFR BLD CALC: 45.5 % — HIGH (ref 34.5–45)
HGB BLD-MCNC: 15.6 G/DL — HIGH (ref 11.5–15.5)
IMM GRANULOCYTES NFR BLD AUTO: 0.9 % — SIGNIFICANT CHANGE UP (ref 0–0.9)
LYMPHOCYTES # BLD AUTO: 2.64 K/UL — SIGNIFICANT CHANGE UP (ref 1–3.3)
LYMPHOCYTES # BLD AUTO: 22.7 % — SIGNIFICANT CHANGE UP (ref 13–44)
MAGNESIUM SERPL-MCNC: 1.8 MG/DL — SIGNIFICANT CHANGE UP (ref 1.6–2.6)
MCHC RBC-ENTMCNC: 31.1 PG — SIGNIFICANT CHANGE UP (ref 27–34)
MCHC RBC-ENTMCNC: 34.3 GM/DL — SIGNIFICANT CHANGE UP (ref 32–36)
MCV RBC AUTO: 90.6 FL — SIGNIFICANT CHANGE UP (ref 80–100)
MONOCYTES # BLD AUTO: 0.81 K/UL — SIGNIFICANT CHANGE UP (ref 0–0.9)
MONOCYTES NFR BLD AUTO: 7 % — SIGNIFICANT CHANGE UP (ref 2–14)
NEUTROPHILS # BLD AUTO: 7.75 K/UL — HIGH (ref 1.8–7.4)
NEUTROPHILS NFR BLD AUTO: 66.6 % — SIGNIFICANT CHANGE UP (ref 43–77)
NT-PROBNP SERPL-SCNC: 203 PG/ML — SIGNIFICANT CHANGE UP (ref 0–300)
PLATELET # BLD AUTO: 245 K/UL — SIGNIFICANT CHANGE UP (ref 150–400)
POTASSIUM SERPL-MCNC: 3.6 MMOL/L — SIGNIFICANT CHANGE UP (ref 3.5–5.3)
POTASSIUM SERPL-SCNC: 3.6 MMOL/L — SIGNIFICANT CHANGE UP (ref 3.5–5.3)
PROT SERPL-MCNC: 6.6 G/DL — SIGNIFICANT CHANGE UP (ref 6.6–8.7)
RBC # BLD: 5.02 M/UL — SIGNIFICANT CHANGE UP (ref 3.8–5.2)
RBC # FLD: 13.6 % — SIGNIFICANT CHANGE UP (ref 10.3–14.5)
SODIUM SERPL-SCNC: 136 MMOL/L — SIGNIFICANT CHANGE UP (ref 135–145)
TROPONIN T SERPL-MCNC: <0.01 NG/ML — SIGNIFICANT CHANGE UP (ref 0–0.06)
WBC # BLD: 11.62 K/UL — HIGH (ref 3.8–10.5)
WBC # FLD AUTO: 11.62 K/UL — HIGH (ref 3.8–10.5)

## 2023-09-23 PROCEDURE — G1004: CPT

## 2023-09-23 PROCEDURE — 99284 EMERGENCY DEPT VISIT MOD MDM: CPT

## 2023-09-23 PROCEDURE — 70486 CT MAXILLOFACIAL W/O DYE: CPT | Mod: MA

## 2023-09-23 PROCEDURE — 93010 ELECTROCARDIOGRAM REPORT: CPT

## 2023-09-23 PROCEDURE — 99285 EMERGENCY DEPT VISIT HI MDM: CPT | Mod: 25

## 2023-09-23 PROCEDURE — 72125 CT NECK SPINE W/O DYE: CPT | Mod: MG

## 2023-09-23 PROCEDURE — 85025 COMPLETE CBC W/AUTO DIFF WBC: CPT

## 2023-09-23 PROCEDURE — 80053 COMPREHEN METABOLIC PANEL: CPT

## 2023-09-23 PROCEDURE — 71045 X-RAY EXAM CHEST 1 VIEW: CPT | Mod: 26

## 2023-09-23 PROCEDURE — 83880 ASSAY OF NATRIURETIC PEPTIDE: CPT

## 2023-09-23 PROCEDURE — 71045 X-RAY EXAM CHEST 1 VIEW: CPT

## 2023-09-23 PROCEDURE — 70450 CT HEAD/BRAIN W/O DYE: CPT | Mod: MG

## 2023-09-23 PROCEDURE — 36415 COLL VENOUS BLD VENIPUNCTURE: CPT

## 2023-09-23 PROCEDURE — 83735 ASSAY OF MAGNESIUM: CPT

## 2023-09-23 PROCEDURE — 72125 CT NECK SPINE W/O DYE: CPT | Mod: 26,MG

## 2023-09-23 PROCEDURE — 84484 ASSAY OF TROPONIN QUANT: CPT

## 2023-09-23 PROCEDURE — 70450 CT HEAD/BRAIN W/O DYE: CPT | Mod: 26,MG

## 2023-09-23 PROCEDURE — 70486 CT MAXILLOFACIAL W/O DYE: CPT | Mod: 26,MA

## 2023-09-23 PROCEDURE — 12002 RPR S/N/AX/GEN/TRNK2.6-7.5CM: CPT

## 2023-09-23 PROCEDURE — 93005 ELECTROCARDIOGRAM TRACING: CPT

## 2023-09-23 NOTE — ED PROVIDER NOTE - NS ED ROS FT
Constitutional: (-) fever  (-)chills  (-)sweats  Eyes/ENT: (-)   Cardiovascular: (-) chest pain, (-) palpitations (-) edema   Respiratory: (-) cough, (-) shortness of breath   Gastrointestinal: (-)nausea  (-)vomiting, (-) diarrhea  (-) abdominal pain   :  (-)dysuria, (-)frequency, (-)urgency, (-)hematuria  Musculoskeletal: (-) neck pain, (-) back pain, (-) joint pain  Integumentary: (-) rash, (-) edema  Neurological: (+) headache, (-) altered mental status  (+)LOC

## 2023-09-23 NOTE — ED PROVIDER NOTE - PATIENT PORTAL LINK FT
You can access the FollowMyHealth Patient Portal offered by [facility] by registering at the following website: [email or web address]. By joining [facility]’s FollowMyHealth portal, you will also be able to view your health information using other applications (apps) compatible with our system.

## 2023-09-23 NOTE — ED PROVIDER NOTE - CLINICAL SUMMARY MEDICAL DECISION MAKING FREE TEXT BOX
patient with syncope s/p drinking 2 mixed drinks at dinner today.  +head trauma. no ich. lac repaired. f/up with pmd.  return for staple removal in 5-7 days.

## 2023-09-23 NOTE — ED ADULT NURSE NOTE - NSFALLHARMRISKINTERV_ED_ALL_ED

## 2023-09-23 NOTE — ED ADULT TRIAGE NOTE - CHIEF COMPLAINT QUOTE
S/P trip and fall from standing height. PT hit head on corner of molding in bedroom. Large laceration to top left side of head that is slightly oozing. Denies LOC. On Plavix. Had two rum punches at dinner.

## 2023-09-23 NOTE — ED ADULT NURSE NOTE - OBJECTIVE STATEMENT
Pt received at 2000. Pt is A&O x 4, breathing even and unlabored, in NAD. Pt is slightly intoxicated, admitting to having "2 drinks" at dinner time a few hours ago. Pt c/o fall at restaurant after consuming 2 drinks, striking head on corner of table. Upon assessment Pt received at 2000. Pt is A&O x 4, breathing even and unlabored, in NAD. Pt is slightly intoxicated, admitting to having "2 drinks" at dinner time a few hours ago. Pt c/o fall at restaurant after consuming 2 drinks, striking head on corner of table. Upon assessment, large laceration on top of head, oozing bright red blood. No other apparent ttrauma. Pt c/o headache 2/10. Awaiting further POC.

## 2023-09-23 NOTE — ED PROVIDER NOTE - OBJECTIVE STATEMENT
68-year-old female; with PMH significant for CAD (status post stent), emphysema, HTN, HLD, PAD; now presenting status post syncopal episode.  Patient states she had 2 drinks out of dinner today.  States when she got home she felt lightheaded and syncopized.  + Head trauma.  + LOC.  Complaining of headache.  Denies nausea or vomiting.  Denies blurry vision.  Denies numbness or tingling.  Denies chest pain, shortness of breath, palpitations prior to syncopal episode.  Denies pain over extremities.

## 2023-09-23 NOTE — ED ADULT NURSE NOTE - SCORE
2 Complex Repair And Flap Additional Text (Will Appearing After The Standard Complex Repair Text): The complex repair was not sufficient to completely close the primary defect. The remaining additional defect was repaired with the flap mentioned below.

## 2023-09-23 NOTE — ED PROVIDER NOTE - PHYSICAL EXAMINATION
Gen: Alert, NAD  Head: NC, 4cm V-shaped lac to left frontal scalp PERRL, EOMI, normal lids/conjunctiva  Neck: +supple, no tenderness/meningismus/JVD, +Trachea midline  Pulm: Bilateral BS, normal resp effort, no wheeze/stridor/retractions  CV: RRR, no M/R/G, +dist pulses  Abd: soft, NT/ND, +BS, no hepatosplenomegaly  Mskel:    L UE: from/nt @shoulder/elbow/wrist/hand   R UE: from/nt @shoulder/elbow/wrist/hand   L LE: from/nt @ hip/knee/ankle   R LE: from/nt @hip/knee/ankle   distal pulses intact  BACK: nt midline c/t/l/s spine.   Neuro: AAOx3, no sensory/motor deficit

## 2023-09-23 NOTE — ED PROVIDER NOTE - NSFOLLOWUPINSTRUCTIONS_ED_ALL_ED_FT
You are advised to please follow up with your primary care doctor within the next 24 hours and return to the Emergency Department for worsening symptoms or any other concerns.  Your doctor may call [phone] to follow up on the specific results of the tests performed today in the emergency department.    PLEASE RETURN IN 5-7 DAYS FOR STAPLE REMOVAL.    Closed Head Injury    A closed head injury is an injury to your head that may or may not involve a traumatic brain injury (TBI).  A CT scan of the head may not have been performed because they are usually normal after a concussion. Concussions are diagnosed and managed based on the history given and the symptoms experienced after the head injury. Most concussions do not cause serious problem and get better over several days.  Symptoms of TBI can be short or long lasting and include headache, dizziness, interference with memory or speech, fatigue, confusion, changes in sleep, mood changes, nausea, depression/anxiety, and dulling of senses. Make sure to obtain proper rest which includes getting plenty of sleep, avoiding excessive visual stimulation, and avoiding activities that may cause physical or mental stress. Avoid any situation where there is potential for another head injury, including sports.    SEEK IMMEDIATE MEDICAL CARE IF YOU HAVE ANY OF THE FOLLOWING SYMPTOMS: unusual drowsiness, vomiting, severe dizziness, seizures, lightheadedness, muscular weakness, different pupil sizes, visual changes, or clear or bloody discharge from your ears or nose.    Laceration    A laceration is a cut that goes through all of the layers of the skin and into the tissue that is right under the skin. Some lacerations heal on their own. Others need to be closed with skin adhesive strips, skin glue, stitches (sutures), or staples. Proper laceration care minimizes the risk of infection and helps the laceration to heal better.  If non-absorbable stitches or staples have been placed, they must be taken out within the time frame instructed by your healthcare provider.    SEEK IMMEDIATE MEDICAL CARE IF YOU HAVE ANY OF THE FOLLOWING SYMPTOMS: swelling around the wound, worsening pain, drainage from the wound, red streaking going away from your wound, inability to move finger or toe near the laceration, or discoloration of skin near the laceration.

## 2023-09-23 NOTE — ED ADULT TRIAGE NOTE - ARRIVAL FROM
Detail Level: Simple
Instructions: This plan will send the code FBSE to the PM system.  DO NOT or CHANGE the price.
Price (Do Not Change): 0.00
Home

## 2023-09-25 PROBLEM — Z95.5 S/P CORONARY ARTERY STENT PLACEMENT: Status: ACTIVE | Noted: 2019-04-16

## 2023-09-25 PROBLEM — E11.9 DIABETIC ON DIET ONLY: Status: ACTIVE | Noted: 2018-08-08

## 2023-09-25 PROBLEM — I25.10 ASHD (ARTERIOSCLEROTIC HEART DISEASE): Status: ACTIVE | Noted: 2018-09-11

## 2023-09-26 ENCOUNTER — APPOINTMENT (OUTPATIENT)
Dept: INTERNAL MEDICINE | Facility: CLINIC | Age: 68
End: 2023-09-26
Payer: MEDICARE

## 2023-09-26 VITALS
BODY MASS INDEX: 25.69 KG/M2 | HEIGHT: 63 IN | SYSTOLIC BLOOD PRESSURE: 145 MMHG | WEIGHT: 145 LBS | DIASTOLIC BLOOD PRESSURE: 70 MMHG

## 2023-09-26 DIAGNOSIS — J43.9 EMPHYSEMA, UNSPECIFIED: ICD-10-CM

## 2023-09-26 DIAGNOSIS — E11.9 TYPE 2 DIABETES MELLITUS W/OUT COMPLICATIONS: ICD-10-CM

## 2023-09-26 DIAGNOSIS — Z95.5 PRESENCE OF CORONARY ANGIOPLASTY IMPLANT AND GRAFT: ICD-10-CM

## 2023-09-26 DIAGNOSIS — I25.10 ATHEROSCLEROTIC HEART DISEASE OF NATIVE CORONARY ARTERY W/OUT ANGINA PECTORIS: ICD-10-CM

## 2023-09-26 PROCEDURE — 99214 OFFICE O/P EST MOD 30 MIN: CPT

## 2023-09-26 RX ORDER — CYCLOBENZAPRINE HYDROCHLORIDE 5 MG/1
5 TABLET, FILM COATED ORAL TWICE DAILY
Qty: 20 | Refills: 0 | Status: ACTIVE | COMMUNITY
Start: 2023-09-26 | End: 1900-01-01

## 2023-10-01 PROBLEM — I10 ESSENTIAL HYPERTENSION: Status: ACTIVE | Noted: 2018-08-08

## 2023-10-01 PROBLEM — S01.01XA SCALP LACERATION: Status: ACTIVE | Noted: 2023-09-26

## 2023-10-02 ENCOUNTER — APPOINTMENT (OUTPATIENT)
Dept: INTERNAL MEDICINE | Facility: CLINIC | Age: 68
End: 2023-10-02
Payer: MEDICARE

## 2023-10-02 VITALS
BODY MASS INDEX: 25.69 KG/M2 | HEIGHT: 63 IN | SYSTOLIC BLOOD PRESSURE: 145 MMHG | DIASTOLIC BLOOD PRESSURE: 70 MMHG | WEIGHT: 145 LBS

## 2023-10-02 DIAGNOSIS — I10 ESSENTIAL (PRIMARY) HYPERTENSION: ICD-10-CM

## 2023-10-02 DIAGNOSIS — S01.01XA LACERATION W/OUT FOREIGN BODY OF SCALP, INITIAL ENCOUNTER: ICD-10-CM

## 2023-10-02 PROCEDURE — 99213 OFFICE O/P EST LOW 20 MIN: CPT

## 2023-10-23 ENCOUNTER — APPOINTMENT (OUTPATIENT)
Dept: ORTHOPEDIC SURGERY | Facility: CLINIC | Age: 68
End: 2023-10-23
Payer: MEDICARE

## 2023-10-23 VITALS
WEIGHT: 145 LBS | HEART RATE: 77 BPM | SYSTOLIC BLOOD PRESSURE: 144 MMHG | BODY MASS INDEX: 25.69 KG/M2 | HEIGHT: 63 IN | DIASTOLIC BLOOD PRESSURE: 85 MMHG

## 2023-10-23 DIAGNOSIS — M47.812 SPONDYLOSIS W/OUT MYELOPATHY OR RADICULOPATHY, CERVICAL REGION: ICD-10-CM

## 2023-10-23 PROCEDURE — 99204 OFFICE O/P NEW MOD 45 MIN: CPT

## 2023-11-16 NOTE — ED ADULT NURSE NOTE - CAS EDN DISCHARGE ASSESSMENT
-- DO NOT REPLY / DO NOT REPLY ALL --  -- Message is from Engagement Center [employer] ([facility]) --    General Patient Message: They are calling and need a PA for  fentaNYL (DURAGESIC) 25 MCG/HR patch.     Case# [id number]      Alternative phone number:     Can a detailed message be left? Yes    Message Turnaround: [place]:    Refer to site's KB page for routing instructions    Please give this turnaround time to the caller:   \"You can expect to receive a response 2-3 business days after your provider's clinical team reviews the message\"              
dizziness
Alert and oriented to person, place and time

## 2023-11-20 ENCOUNTER — APPOINTMENT (OUTPATIENT)
Dept: ORTHOPEDIC SURGERY | Facility: CLINIC | Age: 68
End: 2023-11-20

## 2023-12-18 ENCOUNTER — RX RENEWAL (OUTPATIENT)
Age: 68
End: 2023-12-18

## 2023-12-18 RX ORDER — ESCITALOPRAM OXALATE 10 MG/1
10 TABLET ORAL
Qty: 90 | Refills: 3 | Status: ACTIVE | COMMUNITY
Start: 2021-06-20 | End: 1900-01-01

## 2024-02-03 ENCOUNTER — RX RENEWAL (OUTPATIENT)
Age: 69
End: 2024-02-03

## 2024-02-03 RX ORDER — ESCITALOPRAM OXALATE 5 MG/1
5 TABLET ORAL DAILY
Qty: 90 | Refills: 3 | Status: ACTIVE | COMMUNITY
Start: 2021-05-25 | End: 1900-01-01

## 2024-02-04 ENCOUNTER — RX RENEWAL (OUTPATIENT)
Age: 69
End: 2024-02-04

## 2024-02-12 ENCOUNTER — APPOINTMENT (OUTPATIENT)
Dept: INTERNAL MEDICINE | Facility: CLINIC | Age: 69
End: 2024-02-12

## 2024-03-05 ENCOUNTER — RX RENEWAL (OUTPATIENT)
Age: 69
End: 2024-03-05

## 2024-03-05 RX ORDER — MELOXICAM 15 MG/1
15 TABLET ORAL DAILY
Qty: 15 | Refills: 0 | Status: ACTIVE | COMMUNITY
Start: 2023-10-23 | End: 1900-01-01

## 2024-07-14 PROBLEM — Z13.31 SCREENING FOR DEPRESSION: Status: ACTIVE | Noted: 2024-07-14

## 2024-07-16 ENCOUNTER — APPOINTMENT (OUTPATIENT)
Dept: INTERNAL MEDICINE | Facility: CLINIC | Age: 69
End: 2024-07-16

## 2024-07-16 VITALS
DIASTOLIC BLOOD PRESSURE: 80 MMHG | HEIGHT: 63 IN | SYSTOLIC BLOOD PRESSURE: 140 MMHG | BODY MASS INDEX: 25.69 KG/M2 | WEIGHT: 145 LBS

## 2024-07-16 DIAGNOSIS — F41.9 ANXIETY DISORDER, UNSPECIFIED: ICD-10-CM

## 2024-07-16 DIAGNOSIS — I25.10 ATHEROSCLEROTIC HEART DISEASE OF NATIVE CORONARY ARTERY W/OUT ANGINA PECTORIS: ICD-10-CM

## 2024-07-16 DIAGNOSIS — I10 ESSENTIAL (PRIMARY) HYPERTENSION: ICD-10-CM

## 2024-07-16 DIAGNOSIS — Z13.31 ENCOUNTER FOR SCREENING FOR DEPRESSION: ICD-10-CM

## 2024-07-16 DIAGNOSIS — F17.218 NICOTINE DEPENDENCE, CIGARETTES, WITH OTHER NICOTINE-INDUCED DISORDERS: ICD-10-CM

## 2024-07-16 DIAGNOSIS — Z00.01 ENCOUNTER FOR GENERAL ADULT MEDICAL EXAMINATION WITH ABNORMAL FINDINGS: ICD-10-CM

## 2024-07-16 DIAGNOSIS — I73.9 PERIPHERAL VASCULAR DISEASE, UNSPECIFIED: ICD-10-CM

## 2024-07-16 DIAGNOSIS — Z95.5 PRESENCE OF CORONARY ANGIOPLASTY IMPLANT AND GRAFT: ICD-10-CM

## 2024-07-16 DIAGNOSIS — F32.A ANXIETY DISORDER, UNSPECIFIED: ICD-10-CM

## 2024-07-16 PROCEDURE — G0439: CPT

## 2024-07-16 PROCEDURE — 36415 COLL VENOUS BLD VENIPUNCTURE: CPT

## 2024-07-17 ENCOUNTER — NON-APPOINTMENT (OUTPATIENT)
Age: 69
End: 2024-07-17

## 2024-07-17 LAB
ALBUMIN SERPL ELPH-MCNC: 4.3 G/DL
ALP BLD-CCNC: 100 U/L
ALT SERPL-CCNC: 18 U/L
ANION GAP SERPL CALC-SCNC: 14 MMOL/L
AST SERPL-CCNC: 18 U/L
BASOPHILS # BLD AUTO: 0.11 K/UL
BASOPHILS NFR BLD AUTO: 1.3 %
BILIRUB SERPL-MCNC: 0.4 MG/DL
BUN SERPL-MCNC: 17 MG/DL
CALCIUM SERPL-MCNC: 10.4 MG/DL
CHLORIDE SERPL-SCNC: 102 MMOL/L
CHOLEST SERPL-MCNC: 172 MG/DL
CO2 SERPL-SCNC: 24 MMOL/L
CREAT SERPL-MCNC: 0.84 MG/DL
EGFR: 74 ML/MIN/1.73M2
EOSINOPHIL # BLD AUTO: 0.24 K/UL
EOSINOPHIL NFR BLD AUTO: 2.9 %
ESTIMATED AVERAGE GLUCOSE: 123 MG/DL
GLUCOSE SERPL-MCNC: 95 MG/DL
HBA1C MFR BLD HPLC: 5.9 %
HCT VFR BLD CALC: 45.6 %
HDLC SERPL-MCNC: 74 MG/DL
HGB BLD-MCNC: 14.6 G/DL
IMM GRANULOCYTES NFR BLD AUTO: 1 %
LDLC SERPL CALC-MCNC: 81 MG/DL
LYMPHOCYTES # BLD AUTO: 2 K/UL
LYMPHOCYTES NFR BLD AUTO: 23.8 %
MAN DIFF?: NORMAL
MCHC RBC-ENTMCNC: 32 GM/DL
MCHC RBC-ENTMCNC: 32.4 PG
MCV RBC AUTO: 101.1 FL
MONOCYTES # BLD AUTO: 0.81 K/UL
MONOCYTES NFR BLD AUTO: 9.7 %
NEUTROPHILS # BLD AUTO: 5.15 K/UL
NEUTROPHILS NFR BLD AUTO: 61.3 %
NONHDLC SERPL-MCNC: 98 MG/DL
PLATELET # BLD AUTO: 235 K/UL
POTASSIUM SERPL-SCNC: 5.7 MMOL/L
PROT SERPL-MCNC: 6.4 G/DL
RBC # BLD: 4.51 M/UL
RBC # FLD: 14.5 %
SODIUM SERPL-SCNC: 140 MMOL/L
TRIGL SERPL-MCNC: 96 MG/DL
TSH SERPL-ACNC: 2.6 UIU/ML
WBC # FLD AUTO: 8.39 K/UL

## 2024-07-18 DIAGNOSIS — E11.21 TYPE 2 DIABETES MELLITUS WITH DIABETIC NEPHROPATHY: ICD-10-CM

## 2024-07-18 LAB
CREAT SPEC-SCNC: 53 MG/DL
HCV AB SER QL: NONREACTIVE
HCV S/CO RATIO: 0.15 S/CO
MICROALBUMIN 24H UR DL<=1MG/L-MCNC: 19.3 MG/DL
MICROALBUMIN/CREAT 24H UR-RTO: 368 MG/G

## 2024-07-18 RX ORDER — EMPAGLIFLOZIN 10 MG/1
10 TABLET, FILM COATED ORAL
Qty: 90 | Refills: 0 | Status: ACTIVE | COMMUNITY
Start: 2024-07-18 | End: 1900-01-01

## 2024-07-22 ENCOUNTER — NON-APPOINTMENT (OUTPATIENT)
Age: 69
End: 2024-07-22

## 2024-07-22 VITALS — HEIGHT: 63 IN | WEIGHT: 145 LBS | BODY MASS INDEX: 25.69 KG/M2

## 2024-07-22 DIAGNOSIS — F17.210 NICOTINE DEPENDENCE, CIGARETTES, UNCOMPLICATED: ICD-10-CM

## 2024-07-22 NOTE — HISTORY OF PRESENT ILLNESS
[Current] : Current [TextBox_13] : Ms. MORALES is a 69 year old female with a history of CAD s/p stents, HTN, high cholesterol, PVD and emphysema. Reviewed and confirmed that the patient meets screening eligibility criteria: 69 years old Smoking Status: Current Smoker Number of pack(s) per day: 1 Number of years smoked: 55 Number of pack years smokin  No symptoms of lung cancer, including new cough, change in cough, hemoptysis, and unintentional weight loss. No personal history of lung cancer. No lung cancer in a first degree relative. No history of occupational exposures.  [PacksperYear] : 55

## 2024-07-29 ENCOUNTER — APPOINTMENT (OUTPATIENT)
Dept: CT IMAGING | Facility: CLINIC | Age: 69
End: 2024-07-29
Payer: MEDICARE

## 2024-07-29 PROCEDURE — 71271 CT THORAX LUNG CANCER SCR C-: CPT | Mod: 26

## 2024-07-31 ENCOUNTER — APPOINTMENT (OUTPATIENT)
Dept: PULMONOLOGY | Facility: CLINIC | Age: 69
End: 2024-07-31
Payer: MEDICARE

## 2024-07-31 ENCOUNTER — NON-APPOINTMENT (OUTPATIENT)
Age: 69
End: 2024-07-31

## 2024-07-31 VITALS
RESPIRATION RATE: 16 BRPM | HEART RATE: 72 BPM | OXYGEN SATURATION: 96 % | BODY MASS INDEX: 25.69 KG/M2 | DIASTOLIC BLOOD PRESSURE: 70 MMHG | WEIGHT: 145 LBS | SYSTOLIC BLOOD PRESSURE: 130 MMHG | HEIGHT: 63 IN

## 2024-07-31 PROCEDURE — 99203 OFFICE O/P NEW LOW 30 MIN: CPT

## 2024-07-31 RX ORDER — DAPAGLIFLOZIN 5 MG/1
5 TABLET, FILM COATED ORAL DAILY
Qty: 90 | Refills: 1 | Status: DISCONTINUED | COMMUNITY
Start: 2024-07-22 | End: 2024-07-31

## 2024-07-31 RX ORDER — UMECLIDINIUM 62.5 UG/1
62.5 AEROSOL, POWDER ORAL DAILY
Qty: 1 | Refills: 3 | Status: ACTIVE | COMMUNITY
Start: 2024-07-31 | End: 1900-01-01

## 2024-07-31 NOTE — PHYSICAL EXAM
[No Acute Distress] : no acute distress [Normal Rate/Rhythm] : normal rate/rhythm [Normal S1, S2] : normal s1, s2 [Clear to Auscultation Bilaterally] : clear to auscultation bilaterally [No Clubbing] : no clubbing [No Edema] : no edema [Oriented x3] : oriented x3 [Normal Affect] : normal affect [TextBox_68] : Distant breath sounds

## 2024-07-31 NOTE — HISTORY OF PRESENT ILLNESS
[TextBox_4] : Ms. Hernandez is a 69 year old woman with a significant history of tobacco abuse who presents today after having had a recent CT of her chest.  She is a 1PPD smoker since about 13 years of age, and notes that she has no inclination about quitting. She does have issues with her breathing with humidity when walking on flat surfaces as well as with 2 flights of stairs but has no issues with bending over, showering or bathing. She does feel like she has some post nasal drip as well as clear mucus production, but no blood.  CT Chest 10/25/2021: Few bilateral small lung nodules. Lung-RADS 2: Low likelihood of becoming a clinically active cancer. Recommend annual screening with low dose CT in 12 months.

## 2024-07-31 NOTE — REVIEW OF SYSTEMS
[Fever] : no fever [Chills] : no chills [Cough] : cough [Hemoptysis] : no hemoptysis [Sputum] : sputum [Dyspnea] : dyspnea [SOB on Exertion] : sob on exertion [Chest Discomfort] : no chest discomfort [Orthopnea] : orthopnea [Hay Fever] : hay fever [Nasal Discharge] : nasal discharge [Anemia] : no anemia [History of Iron Deficiency] : no history of iron deficiency [Headache] : no headache [Dizziness] : no dizziness [Paralysis] : no paralysis

## 2024-08-06 ENCOUNTER — RESULT REVIEW (OUTPATIENT)
Age: 69
End: 2024-08-06

## 2024-08-06 ENCOUNTER — APPOINTMENT (OUTPATIENT)
Dept: MAMMOGRAPHY | Facility: CLINIC | Age: 69
End: 2024-08-06

## 2024-08-06 PROCEDURE — 77067 SCR MAMMO BI INCL CAD: CPT | Mod: 26

## 2024-08-06 PROCEDURE — 77063 BREAST TOMOSYNTHESIS BI: CPT | Mod: 26

## 2024-08-23 ENCOUNTER — NON-APPOINTMENT (OUTPATIENT)
Age: 69
End: 2024-08-23

## 2024-09-30 ENCOUNTER — RX RENEWAL (OUTPATIENT)
Age: 69
End: 2024-09-30